# Patient Record
Sex: FEMALE | Race: BLACK OR AFRICAN AMERICAN | NOT HISPANIC OR LATINO | Employment: FULL TIME | URBAN - METROPOLITAN AREA
[De-identification: names, ages, dates, MRNs, and addresses within clinical notes are randomized per-mention and may not be internally consistent; named-entity substitution may affect disease eponyms.]

---

## 2023-04-29 LAB
ALANINE AMINOTRANSFERASE (SGPT) (U/L) IN SER/PLAS: 43 U/L (ref 7–45)
ALBUMIN (G/DL) IN SER/PLAS: 3.8 G/DL (ref 3.4–5)
ALBUMIN (MG/L) IN URINE: <7 MG/L
ALBUMIN/CREATININE (UG/MG) IN URINE: NORMAL UG/MG CRT (ref 0–30)
ALKALINE PHOSPHATASE (U/L) IN SER/PLAS: 97 U/L (ref 33–110)
ANION GAP IN SER/PLAS: 13 MMOL/L (ref 10–20)
ASPARTATE AMINOTRANSFERASE (SGOT) (U/L) IN SER/PLAS: 40 U/L (ref 9–39)
BILIRUBIN TOTAL (MG/DL) IN SER/PLAS: 0.6 MG/DL (ref 0–1.2)
CALCIUM (MG/DL) IN SER/PLAS: 9.2 MG/DL (ref 8.6–10.6)
CARBON DIOXIDE, TOTAL (MMOL/L) IN SER/PLAS: 25 MMOL/L (ref 21–32)
CHLORIDE (MMOL/L) IN SER/PLAS: 101 MMOL/L (ref 98–107)
CHOLESTEROL (MG/DL) IN SER/PLAS: 159 MG/DL (ref 0–199)
CHOLESTEROL IN HDL (MG/DL) IN SER/PLAS: 51.1 MG/DL
CHOLESTEROL/HDL RATIO: 3.1
CORTISOL (UG/DL) IN SERUM - AM: 9.9 UG/DL (ref 5–20)
CREATININE (MG/DL) IN SER/PLAS: 0.79 MG/DL (ref 0.5–1.05)
CREATININE (MG/DL) IN URINE: 43.6 MG/DL (ref 20–320)
ESTIMATED AVERAGE GLUCOSE FOR HBA1C: 289 MG/DL
GFR FEMALE: >90 ML/MIN/1.73M2
GLUCOSE (MG/DL) IN SER/PLAS: 421 MG/DL (ref 74–99)
HEMOGLOBIN A1C/HEMOGLOBIN TOTAL IN BLOOD: 11.7 %
LDL: 88 MG/DL (ref 0–99)
POTASSIUM (MMOL/L) IN SER/PLAS: 5 MMOL/L (ref 3.5–5.3)
PROTEIN TOTAL: 7.4 G/DL (ref 6.4–8.2)
SODIUM (MMOL/L) IN SER/PLAS: 134 MMOL/L (ref 136–145)
THYROTROPIN (MIU/L) IN SER/PLAS BY DETECTION LIMIT <= 0.05 MIU/L: 4.05 MIU/L (ref 0.44–3.98)
THYROXINE (T4) FREE (NG/DL) IN SER/PLAS: 0.89 NG/DL (ref 0.78–1.48)
TRIGLYCERIDE (MG/DL) IN SER/PLAS: 98 MG/DL (ref 0–149)
UREA NITROGEN (MG/DL) IN SER/PLAS: 16 MG/DL (ref 6–23)
VLDL: 20 MG/DL (ref 0–40)

## 2023-05-01 LAB — URINE CULTURE: ABNORMAL

## 2023-05-03 LAB — ADRENOCORTICOTROPIC HORMONE: 11.1 PG/ML (ref 7.2–63.3)

## 2023-06-13 ENCOUNTER — OFFICE VISIT (OUTPATIENT)
Dept: PRIMARY CARE | Facility: CLINIC | Age: 33
End: 2023-06-13
Payer: COMMERCIAL

## 2023-06-13 VITALS
DIASTOLIC BLOOD PRESSURE: 74 MMHG | SYSTOLIC BLOOD PRESSURE: 110 MMHG | BODY MASS INDEX: 26.12 KG/M2 | HEART RATE: 92 BPM | HEIGHT: 64 IN | WEIGHT: 153 LBS

## 2023-06-13 DIAGNOSIS — E05.00 GRAVES DISEASE: ICD-10-CM

## 2023-06-13 DIAGNOSIS — R74.8 ABNORMAL LIVER ENZYMES: ICD-10-CM

## 2023-06-13 DIAGNOSIS — E10.10 TYPE 1 DIABETES MELLITUS WITH KETOACIDOSIS WITHOUT COMA (MULTI): Primary | ICD-10-CM

## 2023-06-13 DIAGNOSIS — R19.7 DIARRHEA, UNSPECIFIED TYPE: ICD-10-CM

## 2023-06-13 DIAGNOSIS — K21.9 GASTROESOPHAGEAL REFLUX DISEASE WITHOUT ESOPHAGITIS: ICD-10-CM

## 2023-06-13 DIAGNOSIS — E01.0 THYROMEGALY: ICD-10-CM

## 2023-06-13 DIAGNOSIS — E04.0 DIFFUSE GOITER: ICD-10-CM

## 2023-06-13 PROBLEM — E11.36 CATARACT, DIABETIC (MULTI): Status: ACTIVE | Noted: 2023-06-13

## 2023-06-13 PROBLEM — E06.3 HASHIMOTO'S THYROIDITIS: Status: ACTIVE | Noted: 2023-06-13

## 2023-06-13 PROBLEM — H26.492 PCO (POSTERIOR CAPSULAR OPACIFICATION), LEFT: Status: ACTIVE | Noted: 2023-06-13

## 2023-06-13 PROCEDURE — 3078F DIAST BP <80 MM HG: CPT | Performed by: INTERNAL MEDICINE

## 2023-06-13 PROCEDURE — 86003 ALLG SPEC IGE CRUDE XTRC EA: CPT

## 2023-06-13 PROCEDURE — 86225 DNA ANTIBODY NATIVE: CPT

## 2023-06-13 PROCEDURE — 99214 OFFICE O/P EST MOD 30 MIN: CPT | Performed by: INTERNAL MEDICINE

## 2023-06-13 PROCEDURE — 86039 ANTINUCLEAR ANTIBODIES (ANA): CPT

## 2023-06-13 PROCEDURE — 86038 ANTINUCLEAR ANTIBODIES: CPT

## 2023-06-13 PROCEDURE — 3046F HEMOGLOBIN A1C LEVEL >9.0%: CPT | Performed by: INTERNAL MEDICINE

## 2023-06-13 PROCEDURE — 3074F SYST BP LT 130 MM HG: CPT | Performed by: INTERNAL MEDICINE

## 2023-06-13 PROCEDURE — 86235 NUCLEAR ANTIGEN ANTIBODY: CPT

## 2023-06-13 PROCEDURE — 1036F TOBACCO NON-USER: CPT | Performed by: INTERNAL MEDICINE

## 2023-06-13 RX ORDER — FAMOTIDINE 20 MG/1
20 TABLET, FILM COATED ORAL 2 TIMES DAILY
COMMUNITY
Start: 2019-10-28 | End: 2023-09-28 | Stop reason: ALTCHOICE

## 2023-06-13 RX ORDER — GLUCAGON 3 MG/1
1 POWDER NASAL AS NEEDED
COMMUNITY
Start: 2023-04-12 | End: 2023-10-09 | Stop reason: ALTCHOICE

## 2023-06-13 RX ORDER — ONDANSETRON 4 MG/1
4 TABLET, ORALLY DISINTEGRATING ORAL EVERY 8 HOURS PRN
COMMUNITY
Start: 2022-07-01

## 2023-06-13 RX ORDER — OMEPRAZOLE 40 MG/1
40 CAPSULE, DELAYED RELEASE ORAL DAILY
COMMUNITY
Start: 2023-04-21

## 2023-06-13 RX ORDER — EZETIMIBE 10 MG/1
10 TABLET ORAL NIGHTLY
COMMUNITY
Start: 2023-05-03 | End: 2023-10-04 | Stop reason: ALTCHOICE

## 2023-06-13 ASSESSMENT — PATIENT HEALTH QUESTIONNAIRE - PHQ9
2. FEELING DOWN, DEPRESSED OR HOPELESS: NOT AT ALL
SUM OF ALL RESPONSES TO PHQ9 QUESTIONS 1 AND 2: 0
1. LITTLE INTEREST OR PLEASURE IN DOING THINGS: NOT AT ALL

## 2023-06-13 NOTE — PROGRESS NOTES
"Subjective   Patient ID: Matthew Land is a 32 y.o. female who presents for [facility] and New Patient Visit.    HPI     Patient is a 32-year-old female with past medical history of Hashimoto's Graves' type 1 diabetes who presents to [facility].  Patient's main concern today is ongoing diarrhea and epigastric pain.  She has been previously diagnosed with GERD and was evaluated for celiac.  She takes a PPI regularly however no significant improvement.  She is concerned she might have an underlying food allergy.  She is never been evaluated by an allergist.  She is also never been evaluated by a gastroenterologist.  She has been evaluated for celiac previously which was negative however an IgA test was not obtained.    She follows with endocrinology regarding her type 1 diabetes.  She is states that she is supposed to get a insulin pump but currently does not have 1.  She has a continuous glucose monitor on and her A1c last was greater than 11.  She knows that her sugars are not well controlled and she has an appointment upcoming with a gastroenterologist.    Graves' disease and possible Hashimoto's.  Her most recent TSH was unremarkable.  She is currently not on levothyroxine.  Her thyroid labs are being monitored by endocrinology        Review of Systems  Constitutional: No fever or chills  Cardiovascular: no chest pain, no palpitations and no syncope.   Respiratory: no cough, no shortness of breath during exertion and no shortness of breath at rest.   Gastrointestinal: no abdominal pain, no nausea and no vomiting.  Neuro: No Headache, no dizziness    Objective   /74   Pulse 92   Ht 1.613 m (5' 3.5\")   Wt 69.4 kg (153 lb)   BMI 26.68 kg/m²     Physical Exam  Constitutional: Alert and in no acute distress. Well developed, well nourished  Head and Face: Head and face: Normal.    Cardiovascular: Heart rate and rhythm were normal, normal S1 and S2. No peripheral edema.   Pulmonary: No respiratory " distress. Clear bilateral breath sounds.  Musculoskeletal: Gait and station: Normal. Muscle strength/tone: Normal.   Skin: Normal skin color and pigmentation, normal skin turgor, and no rash.    Psychiatric: Judgment and insight: Intact. Mood and affect: Normal.        Lab Results   Component Value Date    WBC 7.6 09/16/2021    HGB 14.1 09/16/2021    HCT 43.3 09/16/2021     09/16/2021    CHOL 159 04/29/2023    TRIG 98 04/29/2023    HDL 51.1 04/29/2023    ALT 43 04/29/2023    AST 40 (H) 04/29/2023     (L) 04/29/2023    K 5.0 04/29/2023     04/29/2023    CREATININE 0.79 04/29/2023    BUN 16 04/29/2023    CO2 25 04/29/2023    TSH 4.05 (H) 04/29/2023    HGBA1C 11.7 (A) 04/29/2023       Electrocardiogram 12 Lead  Please see ED Provider Note for formal interpretation  Confirmed by DAVINA MORRELL PA-C ([id number]) on 9/17/2021 3:12:41 AM            Assessment/Plan   Problem List Items Addressed This Visit          Digestive    GERD (gastroesophageal reflux disease)     Unresponsive to PPI.  Will refer to gastroenterology.  Check IgA levels.  Check allergy food panel.  Encourage Benefiber.  Follow-up after evaluation by gastroenterology         Relevant Orders    IgA       Endocrine/Metabolic    Type 1 diabetes mellitus without complication (CMS/HCC) - Primary     Uncontrolled at this time.  Would benefit from not only a continuous glucose monitor but a insulin pump.  Advise follow-up with endocrinology to discuss further         Diffuse goiter     Continue following with endocrinology for repeat thyroid labs         Relevant Orders    Food Allergy Profile IgE    ANISHA with Reflex to LINDA    IgA    Graves disease    Relevant Orders    Food Allergy Profile IgE    ANISHA with Reflex to LINDA    IgA    Thyromegaly    Relevant Orders    Food Allergy Profile IgE    ANISHA with Reflex to LINDA    IgA       Other    Abnormal liver enzymes    Relevant Orders    Food Allergy Profile IgE    ANISHA with Reflex to LINDA    IgA     Other Visit  Diagnoses       Diarrhea, unspecified type        Relevant Orders    Referral to Gastroenterology    IgA

## 2023-06-14 LAB
ANA PATTERN: ABNORMAL
ANA TITER: ABNORMAL
ANTI-NUCLEAR ANTIBODY (ANA): POSITIVE

## 2023-06-15 LAB
ALLERGEN FOOD: CLAM (RUDITAPES SPP.) IGE (KU/L): <0.1 KU/L
ALLERGEN FOOD: EGG WHITE IGE (KU/L): <0.1 KU/L
ALLERGEN FOOD: FISH (COD) GADUS MORHUA) IGE (KU/L): <0.1 KU/L
ALLERGEN FOOD: MAIZE, CORN (ZEA MAYS) IGE (KU/L): <0.1 KU/L
ALLERGEN FOOD: MILK IGE (KU/L): 0.28 KU/L
ALLERGEN FOOD: PEANUT (ARACHIS HYPOGAEA) IGE (KU/L): <0.1 KU/L
ALLERGEN FOOD: SCALLOP (PECTEN SPP.) IGE (KU/L): <0.1 KU/L
ALLERGEN FOOD: SESAME SEED (SESAMUM INDICUM) IGE (KU/L): <0.1 KU/L
ALLERGEN FOOD: SHRIMP (P. BOREALIS/MONODON, M. BARBATA/JOYNERI) IGE (KU/L): <0.1 KU/L
ALLERGEN FOOD: SOYBEAN (GLYCINE MAX) IGE (KU/L): <0.1 KU/L
ALLERGEN FOOD: WALNUT (JUGLANS SPP.) IGE (KU/L): <0.1 KU/L
ALLERGEN FOOD: WHEAT (TRITICUM AESTIVUM) IGE (KU/L): <0.1 KU/L
ANTI-CENTROMERE: <0.2 AI
ANTI-CHROMATIN: <0.2 AI
ANTI-DNA (DS): 1 IU/ML
ANTI-JO-1 IGG: <0.2 AI
ANTI-RIBOSOMAL P: <0.2 AI
ANTI-RNP: 0.2 AI
ANTI-SCL-70: <0.2 AI
ANTI-SM/RNP: <0.2 AI
ANTI-SM: <0.2 AI
ANTI-SSA: <0.2 AI
ANTI-SSB: <0.2 AI
IMMUNOCAP INTERPRETATION: NORMAL

## 2023-09-27 ENCOUNTER — PATIENT OUTREACH (OUTPATIENT)
Dept: PRIMARY CARE | Facility: CLINIC | Age: 33
End: 2023-09-27
Payer: COMMERCIAL

## 2023-09-27 NOTE — PROGRESS NOTES
Dis[facility] Facility:[facility]  Discharge Diagnosis:DM  Admission Date:9/24/23  Discharge Date: 9/26/23    PCP Appointment Date:9/28/23  Specialist Appointment Date: Endocrinology 12/20/23  Hospital Encounter and Summary: Linked   See discharge assessment below for further details  Engagement  Call Start Time: 1211 (9/27/2023 12:11 PM)    Medications  Medications reviewed with patient/caregiver?: Yes (9/27/2023 12:11 PM)  Is the patient having any side effects they believe may be caused by any medication additions or changes?: No (9/27/2023 12:11 PM)  Does the patient have all medications ordered at discharge?: Yes (9/27/2023 12:11 PM)  Prescription Comments: see med list (9/27/2023 12:11 PM)  Is the patient taking all medications as directed (includes completed medication regime)?: Yes (9/27/2023 12:11 PM)  Medication Comments: see med list, patient reported having all DM medications and Cefdinir (9/27/2023 12:11 PM)    Appointments  Does the patient have a primary care provider?: Yes (9/27/2023 12:11 PM)  Care Management Interventions: Verified appointment date/time/provider (9/28/23 [facility]) (9/27/2023 12:11 PM)  Has the patient kept scheduled appointments due by today?: Yes (9/27/2023 12:11 PM)  Care Management Interventions: Advised patient to keep appointment (9/27/2023 12:11 PM)    Patient Teaching  Does the patient have access to their discharge instructions?: Yes (9/27/2023 12:11 PM)  Care Management Interventions: Reviewed instructions with patient (9/27/2023 12:11 PM)  What is the patient's perception of their health status since discharge?: Improving (9/27/2023 12:11 PM)  Is the patient/caregiver able to teach back the hierarchy of who to call/visit for symptoms/problems? PCP, Specialist, Home Health nurse, Urgent Care, ED, 911: Yes (9/27/2023 12:11 PM)

## 2023-09-28 ENCOUNTER — OFFICE VISIT (OUTPATIENT)
Dept: PRIMARY CARE | Facility: CLINIC | Age: 33
End: 2023-09-28
Payer: COMMERCIAL

## 2023-09-28 VITALS
BODY MASS INDEX: 26.85 KG/M2 | WEIGHT: 154 LBS | SYSTOLIC BLOOD PRESSURE: 108 MMHG | DIASTOLIC BLOOD PRESSURE: 71 MMHG | HEART RATE: 92 BPM

## 2023-09-28 DIAGNOSIS — E05.00 GRAVES DISEASE: ICD-10-CM

## 2023-09-28 DIAGNOSIS — E10.9 TYPE 1 DIABETES MELLITUS WITHOUT COMPLICATION (MULTI): Primary | ICD-10-CM

## 2023-09-28 PROBLEM — E10.36: Status: ACTIVE | Noted: 2023-06-13

## 2023-09-28 PROBLEM — E11.10 DKA (DIABETIC KETOACIDOSIS) (MULTI): Status: ACTIVE | Noted: 2023-09-28

## 2023-09-28 PROCEDURE — 1036F TOBACCO NON-USER: CPT | Performed by: INTERNAL MEDICINE

## 2023-09-28 PROCEDURE — 99214 OFFICE O/P EST MOD 30 MIN: CPT | Performed by: INTERNAL MEDICINE

## 2023-09-28 PROCEDURE — 3046F HEMOGLOBIN A1C LEVEL >9.0%: CPT | Performed by: INTERNAL MEDICINE

## 2023-09-28 PROCEDURE — 3078F DIAST BP <80 MM HG: CPT | Performed by: INTERNAL MEDICINE

## 2023-09-28 PROCEDURE — 3074F SYST BP LT 130 MM HG: CPT | Performed by: INTERNAL MEDICINE

## 2023-09-28 RX ORDER — PANTOPRAZOLE SODIUM 40 MG/1
40 TABLET, DELAYED RELEASE ORAL
COMMUNITY

## 2023-09-28 RX ORDER — INSULIN GLARGINE 100 [IU]/ML
24 INJECTION, SOLUTION SUBCUTANEOUS NIGHTLY
COMMUNITY
End: 2023-09-28 | Stop reason: SDUPTHER

## 2023-09-28 RX ORDER — INSULIN LISPRO 100 [IU]/ML
10 INJECTION, SOLUTION INTRAVENOUS; SUBCUTANEOUS
COMMUNITY
End: 2023-10-04 | Stop reason: SDUPTHER

## 2023-09-28 RX ORDER — INSULIN LISPRO 100 [IU]/ML
4 INJECTION, SOLUTION INTRAVENOUS; SUBCUTANEOUS
COMMUNITY
Start: 2019-06-09 | End: 2023-09-28 | Stop reason: SDUPTHER

## 2023-09-28 RX ORDER — INSULIN LISPRO 100 [IU]/ML
4 INJECTION, SOLUTION INTRAVENOUS; SUBCUTANEOUS
Qty: 12 ML | Refills: 2 | Status: SHIPPED | OUTPATIENT
Start: 2023-09-28 | End: 2024-01-31 | Stop reason: WASHOUT

## 2023-09-28 RX ORDER — INSULIN GLARGINE 100 [IU]/ML
24 INJECTION, SOLUTION SUBCUTANEOUS NIGHTLY
Qty: 10 ML | Refills: 2 | Status: SHIPPED | OUTPATIENT
Start: 2023-09-28 | End: 2024-01-31 | Stop reason: WASHOUT

## 2023-09-28 NOTE — ASSESSMENT & PLAN NOTE
Not withstanding the fact that her A1c is greater than 12 the issue at hand is the fact that she is not taking her insulin as prescribed.  Issue #1 is that patient is concerned about the discrepancy between the continuous glucose monitor and the fingerprinting and as such she does not want to take the  Insulin sliding scale.  We will have patient see the nurse in the next couple of weeks to assess whether or not there is a discrepancy and she may need a different continuous glucose monitor.      Patient states she just needs to get and see the endocrinologist and get a insulin pump.  Explained to the patient that more importantly she needs to take her medications as prescribed as it is quite dangerous for her not to be on medications as a type I diabetic.  We will refill patient's Lantus to be taking 24 units nightly as well as 4 units of Humalog with meals with insulin sliding scale.    Continue with mealtime glucose monitoring.  Follow-up with endocrinology

## 2023-09-28 NOTE — PROGRESS NOTES
Subjective   Patient ID: Matthew Land is a 33 y.o. female who presents for Follow-up (Follow up to hospital stay-diabetes).    HPI     Patient is a 33-year-old female with past medical history of Hashimoto's Graves' type 1 diabetes who presents for follow-up.      Patient was recently admitted to the hospital with DKA.  Patient has not been taking her insulin as prescribed.  She does have an endocrinologist however the endocrinologist is leaving [facility] and she has a follow-up appointment with an endocrinologist in December.  Seems that prior to her presentation to the hospital she stopped taking her insulin.  She was very concerned.  She does not have a good reason as to why she stopped taking her insulin.  She has some hesitancy taking her insulin because she does not want to become hypoglycemic she has a continuous glucose monitor and there appears to be a discrepancy between the finger prick as well as the continuous glucose monitor.  As such she stopped taking her insulin.  In the hospital she was restarted on her insulin and started on an insulin drip.  Subsequently discharged since being discharged she has been taking her insulin but check she has been reluctant to take the insulin sliding scale.    Most recent A1c is 12  Graves' disease and possible Hashimoto's.  Her most recent TSH was unremarkable.  She is currently not on levothyroxine.  Her thyroid labs are being monitored by endocrinology        Review of Systems  Constitutional: No fever or chills  Cardiovascular: no chest pain, no palpitations and no syncope.   Respiratory: no cough, no shortness of breath during exertion and no shortness of breath at rest.   Gastrointestinal: no abdominal pain, no nausea and no vomiting.  Neuro: No Headache, no dizziness    Objective   /71   Pulse 92   Wt 69.9 kg (154 lb)   BMI 26.85 kg/m²     Physical Exam  Constitutional: Alert and in no acute distress. Well developed, well nourished  Head  and Face: Head and face: Normal.    Cardiovascular: Heart rate and rhythm were normal, normal S1 and S2. No peripheral edema.   Pulmonary: No respiratory distress. Clear bilateral breath sounds.  Musculoskeletal: Gait and station: Normal. Muscle strength/tone: Normal.   Skin: Normal skin color and pigmentation, normal skin turgor, and no rash.    Psychiatric: Judgment and insight: Intact. Mood and affect: Normal.        Lab Results   Component Value Date    WBC 6.8 09/26/2023    HGB 12.2 09/26/2023    HCT 37.7 09/26/2023     09/26/2023    CHOL 175 09/24/2023    TRIG 59 09/24/2023    HDL 62.1 09/24/2023    ALT 24 09/25/2023    AST 19 09/25/2023     (L) 09/26/2023    K 3.9 09/26/2023     09/26/2023    CREATININE 0.69 09/26/2023    BUN 9 09/26/2023    CO2 24 09/26/2023    TSH 4.05 (H) 04/29/2023    HGBA1C 13.4 (A) 09/24/2023       Electrocardiogram 12 Lead  Please see ED Provider Note for formal interpretation  Confirmed by DAVINA MORRELL PA-C ([id number]) on 9/17/2021 3:12:41 AM            Assessment/Plan   Problem List Items Addressed This Visit       Type 1 diabetes mellitus without complication (CMS/HCC) - Primary     Not withstanding the fact that her A1c is greater than 12 the issue at hand is the fact that she is not taking her insulin as prescribed.  Issue #1 is that patient is concerned about the discrepancy between the continuous glucose monitor and the fingerprinting and as such she does not want to take the  Insulin sliding scale.  We will have patient see the nurse in the next couple of weeks to assess whether or not there is a discrepancy and she may need a different continuous glucose monitor.      Patient states she just needs to get and see the endocrinologist and get a insulin pump.  Explained to the patient that more importantly she needs to take her medications as prescribed as it is quite dangerous for her not to be on medications as a type I diabetic.  We will refill patient's Lantus to  be taking 24 units nightly as well as 4 units of Humalog with meals with insulin sliding scale.    Continue with mealtime glucose monitoring.  Follow-up with endocrinology         Relevant Medications    insulin lispro (HumaLOG) 100 unit/mL injection    insulin glargine (Lantus U-100 Insulin) 100 unit/mL injection    Other Relevant Orders    Follow Up In [facility] - PCP - Nurse Visit    Follow Up In [facility] - Pharmacy    Follow Up In [facility] - PCP - Established    Graves disease

## 2023-09-30 PROBLEM — N39.0 UTI (URINARY TRACT INFECTION), BACTERIAL: Status: ACTIVE | Noted: 2023-09-30

## 2023-09-30 PROBLEM — H26.40 ANTERIOR CAPSULAR OPACIFICATION: Status: ACTIVE | Noted: 2023-09-30

## 2023-09-30 PROBLEM — R23.4 FISSURE IN SKIN OF FOOT: Status: ACTIVE | Noted: 2023-09-30

## 2023-09-30 PROBLEM — E03.9 ADULT HYPOTHYROIDISM: Status: ACTIVE | Noted: 2023-09-30

## 2023-09-30 PROBLEM — R81 GLUCOSURIA: Status: ACTIVE | Noted: 2023-09-30

## 2023-09-30 PROBLEM — A49.9 UTI (URINARY TRACT INFECTION), BACTERIAL: Status: ACTIVE | Noted: 2023-09-30

## 2023-09-30 PROBLEM — B00.9 HSV (HERPES SIMPLEX VIRUS) INFECTION: Status: ACTIVE | Noted: 2023-09-30

## 2023-09-30 PROBLEM — R30.0 DYSURIA: Status: ACTIVE | Noted: 2023-09-30

## 2023-09-30 PROBLEM — R26.2 DIFFICULTY WALKING: Status: ACTIVE | Noted: 2023-09-30

## 2023-09-30 PROBLEM — Z96.1 PSEUDOPHAKIA OF BOTH EYES: Status: ACTIVE | Noted: 2023-09-30

## 2023-09-30 PROBLEM — K21.9 GASTROESOPHAGEAL REFLUX DISEASE WITHOUT ESOPHAGITIS: Status: ACTIVE | Noted: 2022-07-13

## 2023-09-30 RX ORDER — BLOOD-GLUCOSE TRANSMITTER
EACH MISCELLANEOUS AS NEEDED
COMMUNITY
End: 2024-04-24 | Stop reason: SDUPTHER

## 2023-09-30 RX ORDER — BLOOD-GLUCOSE,RECEIVER,CONT
1 EACH MISCELLANEOUS AS NEEDED
COMMUNITY

## 2023-09-30 RX ORDER — IBUPROFEN 200 MG
TABLET ORAL AS NEEDED
COMMUNITY

## 2023-09-30 RX ORDER — GLUCAGON 1 MG
VIAL (EA) INJECTION ONCE AS NEEDED
COMMUNITY
End: 2023-10-09 | Stop reason: ALTCHOICE

## 2023-09-30 RX ORDER — INSULIN GLARGINE 100 [IU]/ML
20 INJECTION, SOLUTION SUBCUTANEOUS EVERY MORNING
COMMUNITY
End: 2023-10-04 | Stop reason: SDUPTHER

## 2023-09-30 RX ORDER — LANCETS 33 GAUGE
EACH MISCELLANEOUS
COMMUNITY

## 2023-09-30 RX ORDER — INSULIN LISPRO 100 [IU]/ML
8 INJECTION, SOLUTION INTRAVENOUS; SUBCUTANEOUS
COMMUNITY
End: 2023-10-04 | Stop reason: SDUPTHER

## 2023-09-30 RX ORDER — VALACYCLOVIR HYDROCHLORIDE 500 MG/1
500 TABLET, FILM COATED ORAL DAILY
COMMUNITY

## 2023-09-30 RX ORDER — BLOOD-GLUCOSE SENSOR
EACH MISCELLANEOUS
COMMUNITY
End: 2024-04-24 | Stop reason: SDUPTHER

## 2023-09-30 RX ORDER — DEXTROSE 4 G
TABLET,CHEWABLE ORAL
COMMUNITY

## 2023-09-30 RX ORDER — CEPHALEXIN 500 MG/1
500 CAPSULE ORAL EVERY 12 HOURS
COMMUNITY
End: 2023-12-14 | Stop reason: ALTCHOICE

## 2023-09-30 RX ORDER — PEN NEEDLE, DIABETIC 30 GX3/16"
NEEDLE, DISPOSABLE MISCELLANEOUS
COMMUNITY

## 2023-10-04 ENCOUNTER — LAB (OUTPATIENT)
Dept: LAB | Facility: LAB | Age: 33
End: 2023-10-04
Payer: COMMERCIAL

## 2023-10-04 ENCOUNTER — OFFICE VISIT (OUTPATIENT)
Dept: ENDOCRINOLOGY | Facility: CLINIC | Age: 33
End: 2023-10-04
Payer: COMMERCIAL

## 2023-10-04 VITALS
DIASTOLIC BLOOD PRESSURE: 83 MMHG | WEIGHT: 159 LBS | HEIGHT: 64 IN | SYSTOLIC BLOOD PRESSURE: 117 MMHG | BODY MASS INDEX: 27.14 KG/M2 | HEART RATE: 84 BPM

## 2023-10-04 DIAGNOSIS — E10.65 TYPE 1 DIABETES MELLITUS WITH HYPERGLYCEMIA, WITH LONG-TERM CURRENT USE OF INSULIN (MULTI): Primary | ICD-10-CM

## 2023-10-04 DIAGNOSIS — E06.3 HASHIMOTO'S THYROIDITIS: ICD-10-CM

## 2023-10-04 DIAGNOSIS — E10.65 TYPE 1 DIABETES MELLITUS WITH HYPERGLYCEMIA, WITH LONG-TERM CURRENT USE OF INSULIN (MULTI): ICD-10-CM

## 2023-10-04 PROBLEM — R81 GLUCOSURIA: Status: RESOLVED | Noted: 2023-09-30 | Resolved: 2023-10-04

## 2023-10-04 LAB
ALBUMIN SERPL BCP-MCNC: 3.7 G/DL (ref 3.4–5)
ALP SERPL-CCNC: 78 U/L (ref 33–110)
ALT SERPL W P-5'-P-CCNC: 24 U/L (ref 7–45)
ANION GAP SERPL CALC-SCNC: 11 MMOL/L (ref 10–20)
AST SERPL W P-5'-P-CCNC: 26 U/L (ref 9–39)
BILIRUB SERPL-MCNC: 0.6 MG/DL (ref 0–1.2)
BUN SERPL-MCNC: 9 MG/DL (ref 6–23)
CALCIUM SERPL-MCNC: 9 MG/DL (ref 8.6–10.6)
CHLORIDE SERPL-SCNC: 105 MMOL/L (ref 98–107)
CO2 SERPL-SCNC: 29 MMOL/L (ref 21–32)
CREAT SERPL-MCNC: 0.64 MG/DL (ref 0.5–1.05)
CREAT UR-MCNC: 69.6 MG/DL (ref 20–320)
GFR SERPL CREATININE-BSD FRML MDRD: >90 ML/MIN/1.73M*2
GLUCOSE SERPL-MCNC: 127 MG/DL (ref 74–99)
MICROALBUMIN UR-MCNC: <7 MG/L
MICROALBUMIN/CREAT UR: NORMAL MG/G{CREAT}
POTASSIUM SERPL-SCNC: 4.7 MMOL/L (ref 3.5–5.3)
PROT SERPL-MCNC: 6.8 G/DL (ref 6.4–8.2)
SODIUM SERPL-SCNC: 140 MMOL/L (ref 136–145)
TSH SERPL-ACNC: 6.13 MIU/L (ref 0.44–3.98)

## 2023-10-04 PROCEDURE — 1036F TOBACCO NON-USER: CPT | Performed by: NURSE PRACTITIONER

## 2023-10-04 PROCEDURE — 3062F POS MACROALBUMINURIA REV: CPT | Performed by: NURSE PRACTITIONER

## 2023-10-04 PROCEDURE — 3079F DIAST BP 80-89 MM HG: CPT | Performed by: NURSE PRACTITIONER

## 2023-10-04 PROCEDURE — 80053 COMPREHEN METABOLIC PANEL: CPT

## 2023-10-04 PROCEDURE — 82043 UR ALBUMIN QUANTITATIVE: CPT

## 2023-10-04 PROCEDURE — 82570 ASSAY OF URINE CREATININE: CPT

## 2023-10-04 PROCEDURE — 84439 ASSAY OF FREE THYROXINE: CPT

## 2023-10-04 PROCEDURE — 3046F HEMOGLOBIN A1C LEVEL >9.0%: CPT | Performed by: NURSE PRACTITIONER

## 2023-10-04 PROCEDURE — 95251 CONT GLUC MNTR ANALYSIS I&R: CPT | Performed by: NURSE PRACTITIONER

## 2023-10-04 PROCEDURE — 99214 OFFICE O/P EST MOD 30 MIN: CPT | Performed by: NURSE PRACTITIONER

## 2023-10-04 PROCEDURE — 84443 ASSAY THYROID STIM HORMONE: CPT

## 2023-10-04 PROCEDURE — 3074F SYST BP LT 130 MM HG: CPT | Performed by: NURSE PRACTITIONER

## 2023-10-04 PROCEDURE — 36415 COLL VENOUS BLD VENIPUNCTURE: CPT

## 2023-10-04 NOTE — PATIENT INSTRUCTIONS
Type 1 diabetes mellitus, is not at goal with A1C 13.1% in offoce today. Discussed that A1C this high indicates she is not giving all insulin.   Discussed insulin dosing, basal, bolus, insulin to carb ratio, sensitivity.   She will continue 24 units lantus and change Humalog 1 unit per 12 grams carb and 1 unit per 50 mg/dl   Education:  referred to Diabetes Educator for prepump education.   Follow up: I recommend diabetes care be 2 months.    Hashimotos: Patient is not currently taking mediations. We have reordered labs.     Hyperlipidemia: Stopped Ezetimibe. Unsure why. Labs ordered.

## 2023-10-04 NOTE — PROGRESS NOTES
Subjective   Matthew Land is a 33 y.o. female who presents for an initial visit for evaluation of Type 1 diabetes mellitus. The initial diagnosis of diabetes was made  age 25 .     Known complications due to diabetes included NONE    Cardiovascular risk factors include none. The patient has been previously hospitalized due to diabetic ketoacidosis on September 23, 2023.     Current diabetes regimen is as follows:   Lantus 24 units noghtly  Humalog base 4 plus sliding scale 1 unit per 50 mg/dl> 150 mg/dl    The patient is currently checking the blood glucose 5 times per day.    Patient is using: both glucometer and continuous glucose monitor    Hypoglycemia frequency: <2%  Hypoglycemia awareness: Yes     Exercise: daily   Meal panning: She is using no plan.    Review of Systems    Objective   There were no vitals taken for this visit.  Physical Exam    Lab Review      Health Maintenance:   Eye Exam: Agueda Hendricks, seen Jan 2023, denies retinopathy  Lipid Panel: Took Ezetimibe in the past, is not taking currently  Urine Albumin:    Assessment/Plan   Diagnoses and all orders for this visit:  Type 1 diabetes mellitus with hyperglycemia, with long-term current use of insulin (CMS/[facility])  Hashimoto's thyroiditis    Downloaded and interrogated Dexcom CGM. Patient average glucose 210 mg/dl with time in range of  mg/dl 40%, 181-250 mg/dl 29%, > 250 mg/dl 30%. Patient glucose is stead overnight and hyperglycemic from 12 noon-12 AM.     Type 1 diabetes mellitus, is not at goal with A1C 13.1% in offoce today. Discussed that A1C this high indicates she is not giving all insulin.   Discussed insulin dosing, basal, bolus, insulin to carb ratio, sensitivity.   She will continue 24 units lantus and change Humalog 1 unit per 12 grams carb and 1 unit per 50 mg/dl   Education:  referred to Diabetes Educator for prepump education.   Follow up: I recommend diabetes care be 2 months.    Hashimotos: Patient is not currently taking  mediations. We have reordered labs.     Hyperlipidemia: Stopped Ezetimibe. Unsure why. Labs ordered.

## 2023-10-05 DIAGNOSIS — E06.3 HASHIMOTO'S THYROIDITIS: Primary | ICD-10-CM

## 2023-10-05 LAB — T4 FREE SERPL-MCNC: 0.72 NG/DL (ref 0.78–1.48)

## 2023-10-05 RX ORDER — LEVOTHYROXINE SODIUM 50 UG/1
50 TABLET ORAL DAILY
Qty: 90 TABLET | Refills: 3 | Status: SHIPPED | OUTPATIENT
Start: 2023-10-05 | End: 2024-10-04

## 2023-10-09 ENCOUNTER — TELEPHONE (OUTPATIENT)
Dept: ENDOCRINOLOGY | Facility: CLINIC | Age: 33
End: 2023-10-09

## 2023-10-09 ENCOUNTER — CLINICAL SUPPORT (OUTPATIENT)
Dept: ENDOCRINOLOGY | Facility: CLINIC | Age: 33
End: 2023-10-09
Payer: COMMERCIAL

## 2023-10-09 DIAGNOSIS — E10.65 TYPE 1 DIABETES MELLITUS WITH HYPERGLYCEMIA, WITH LONG-TERM CURRENT USE OF INSULIN (MULTI): Primary | ICD-10-CM

## 2023-10-09 PROCEDURE — 99211 OFF/OP EST MAY X REQ PHY/QHP: CPT | Performed by: NURSE PRACTITIONER

## 2023-10-09 PROCEDURE — 95251 CONT GLUC MNTR ANALYSIS I&R: CPT | Performed by: NURSE PRACTITIONER

## 2023-10-09 RX ORDER — INSULIN LISPRO 100 [IU]/ML
70 INJECTION, SOLUTION INTRAVENOUS; SUBCUTANEOUS CONTINUOUS
Qty: 3000 ML | Refills: 3 | Status: SHIPPED | OUTPATIENT
Start: 2023-10-09 | End: 2023-10-09 | Stop reason: ALTCHOICE

## 2023-10-09 RX ORDER — GLUCAGON INJECTION, SOLUTION 1 MG/.2ML
1 INJECTION, SOLUTION SUBCUTANEOUS AS NEEDED
Qty: 0.2 ML | Refills: 3 | Status: SHIPPED | OUTPATIENT
Start: 2023-10-09

## 2023-10-09 RX ORDER — INSULIN LISPRO 100 [IU]/ML
70 INJECTION, SOLUTION INTRAVENOUS; SUBCUTANEOUS CONTINUOUS
Qty: 3000 ML | Refills: 3 | Status: SHIPPED | OUTPATIENT
Start: 2023-10-09 | End: 2024-01-31 | Stop reason: SDUPTHER

## 2023-10-09 NOTE — PROGRESS NOTES
Reason for Visit:  Matthew Land is a 33 y.o. female who presents for Initial DSME Visit    DSME - Global Assessment    Referring Provider: Sugar Lama CNP, DNP    Marital Status: .  Support Person: Name: Saul and Relationship to patient: spouse.    What do you hope to gain from this diabetes education visit? Learn about Omnipod 5. Already uses DexcomG6    Have you had diabetes education in the past?  Yes. When: Saw JAY inpatient and saw JAY Cueva for DSME within past year or so. Filling out global assesment in case .  In Your words, what is Diabetes: A pain, a hassle  What Concerns you most about having diabetes: I just want to control it finally     Readiness to Learn: demonstrates willingness to learn and demonstrates ability to learn  Preferred learning method: reading, observing, reading and writing, listening, and doing    Household Composition: living independently, with family    Demographics:   Difficulties with: None  Highest Level of Education: Some College  Race/Ethnic Origin: /Black  Occupation:  Lead   Work hours: 8am-5pm M-F    Health Status:  Smoking/Tobacco Use: No, patient does not smoke or use tobacco.  Alcohol Use: Frequency: occasionally.    Type of Diabetes: Type 1  What year were you diagnosed with diabetes: 2295-1650  Family History: No known family members with diabetes    Comorbidities/Chronic Complications: graves disease (other autoimmune diseases)    Lab Results   Component Value Date    HGBA1C 13.4 (A) 09/24/2023    HGBA1C 11.7 (A) 04/29/2023    HGBA1C 12.2 (A) 01/07/2023    HGBA1C 11.4 (A) 04/08/2022    HGBA1C 11.4 (A) 04/08/2022    HGBA1C 15.1 (A) 01/26/2022       Health Utilization Past 12 Months:   Hospital Admissions: Yes. Number of Visits: 1.  ER Visits: Yes. Number of Visits: 2.  Primary Care Visits: Yes. Number of Visits: 2.  Last Eye Exam : earlier 2023  Podiatry : Sees Sherrell Mccollum for podiatry annually and has had  the microfilament exam in the past year as well.  Dentist : Last Visit: a little over a  year ago    Diabetes Self-Management Skills and Behaviors:   Do you exercise regularly?: Yes. 30 minutes per day  Physical Activity : walking and walking to work, playing with kids   Yes  How do you manage your diabetes when you are sick?: drink more fluids, test blood sugar more often, and provided info on [employer] Reli[employer] brand ketone strips and how to use them to identify if extra self-management tasks/reaching out to our team is needed.     Diabetes Medications: insulin pens, insulin, and nasal glucagon. Patient reports adverse burning reaction to nasal Glucagon Baqsimi and would like Gvoke pen instead.     Insulin:   Lantus 24 units @ 530pm   Humalog 4 units base dose + sliding scale 1:50 >150, takes about 30 units a day humalog     Starting settings recommended for OP5   TDD ~54 units   Basal 0000 1.15  Carb ratio: 1:7.5  Correction: 1:35  Target glucose: 110, correct above 110   Reverse correction OFF     Current Outpatient Medications   Medication Sig Dispense Refill    Baqsimi 3 mg/actuation spray,non-aerosol 1 Application by local intranasal application route if needed (for hypoglycemia).      blood sugar diagnostic (ONETOUCH VERIO TEST STRIPS MISC) by in vitro route 3 times a day.      blood-glucose meter misc       blood-glucose sensor (Dexcom G6 Sensor) device       cephalexin (Keflex) 500 mg capsule Take 1 capsule (500 mg) by mouth every 12 hours.      Dexcom G4 platinum  (Dexcom G6 ) misc Inject 1 Device under the skin if needed. Use as instructed      Dexcom G4 platinum transmitter (Dexcom G6 Transmitter) device Inject under the skin if needed. Use as instructed      glucagon (Glucagon Emergency Kit, human,) 1 mg injection Inject under the skin 1 time if needed for low blood sugar - see comments. Use as directed for severe hypoglycemia <55      glucose 4 gram chewable tablet Chew if needed for low  "blood sugar - see comments.      insulin glargine (Lantus U-100 Insulin) 100 unit/mL injection Inject 24 Units under the skin once daily at bedtime. 10 mL 2    insulin lispro (HumaLOG) 100 unit/mL injection Inject 4 Units under the skin 3 times a day with meals. 12 mL 2    ketone blood test strip       lancets 33 gauge misc       levothyroxine (Synthroid, Levoxyl) 50 mcg tablet Take 1 tablet (50 mcg) by mouth once daily. 90 tablet 3    omeprazole (PriLOSEC) 40 mg DR capsule Take 1 capsule (40 mg) by mouth once daily.      ondansetron ODT (Zofran-ODT) 4 mg disintegrating tablet Take 1 tablet (4 mg) by mouth every 8 hours if needed for vomiting.      pantoprazole (ProtoNix) 40 mg EC tablet Take 1 tablet (40 mg) by mouth once daily in the morning. Take before meals.      pen needle, diabetic 31 gauge x 3/16\" needle 5 times a day.      valACYclovir (Valtrex) 500 mg tablet Take 1 tablet (500 mg) by mouth once daily.       No current facility-administered medications for this visit.     Injection/Infusion Sites: abdominal wall and arm for Dexcom sites.  Has some lumpy/bumpy areas occasionally  advised her that it is OK to use arms for Dexcom to help abdomen heal. Recommended rotating sites.     Monitorng: CGM: DexcomG6 CGM mobile richi. Patient is synced to Clarity, but most recent data appearing is only as of 10/4. Deleted sharing and re-invited patient to share. Visibly saw her current active session on her phone. It is not synching. See below for most recent Clarity report.         Acute Complications-Safety: carries glucose    Hypoglycemia: Frequency: a few times a month and sweating  Hypoglycemia Treatment: Juice and a carb like cereal     Hyperglycemia: frequency: daily , defines high as over 300, feels symptoms over 350  Hyperglycemia Treatment: Takes sliding scale if in the 300s every 4 hours     Reproductive/Currently Pregnant : No.  Do you use birth control? : Yes, tubes tied   Are you planning to become pregnant " in the future? : No  Have you reached Menopause? : No    Diabetes Assessment:   DM Interferes with other aspects of my life: agree.  My level of stress is high: agree. Not very high but somewhat high.  I struggle with making changes in my life: neutral. Depends on the type of c hange   How do you handle stress: eat, shop, listen to music   Most difficult part of managing DM: managing it in general     DSME - Meal Planning and Diet Recall  Are you currently following any meal plan: Carbohydrate Counting.    Does your culture or Shinto require any of the following:  none  Who does the grocery shopping? patient  Who does the cooking in the home? patient    How often do you eat out? Once a week   How many meals do you eat in per day: two.  Which meals do you tend to skip: breakfast  What do you drink with and between meals: water and juice    Diet Recall:   Did not focus on this today, focused the rest of the visit on Omnipod5 education. Reviewed carb counting with patient she is able to count carbs in foods she ate yesterday just fine.     DSME - Goals and Recommendations    [facility] Diabetes Education Program SMART Behavior Goal Setting:        S - Specific: Exactly what do you want to do        M - Measurable: Use a calendar or chart to track progress        A - Attainable: Take small steps to make bigger changes        R - Realistic: Pick something reasonable that you know you can do        T - Time Oriented: Choose a time limit (No longer than 6 months)    Specific Goal:   I will not overtreat for hypos for the next four weeks.  Will buy fruit snack or juice box packs that are 15g carbs. Has support from .     Measurable: How will I track my goal:  I will keep track of my progress daily by richi on phone.    Time Oriented: four weeks.    Topics Covered and Impression:    DSME Topics Covered During Visit:   Glucagon Teaching  Review Glycemic Goals (CGM or SMBG)  Reviewed Hypoglycemia  Signs/Symptoms/Tx Plan  Reviewed Hyperglycemia Signs/Symptoms/Tx Plan  Glycemic Pattern Management  Sick Day Rules  Ketone Monitoring  Insulin pump education, AID education. Patient would like to go with Omnipod5 system.     Reviewed Diabetes Technology: Omnipod5 AID system, [employer]         DSME Topics for Follow-Up:   Insulin pump start and follow-up      Materials provided during today's visit: Omnipod5 PowerPoint, Omnipod5 pre-pump training guide       Provider Impression: Patient is ready and motivated to start OP5.     Time: I personally spent a total of 60 minutes with the patient providing diabetes self-management education. Visit documentation will be sent electronically to referring provider.     Provider on site: Pearl Paul, REE Suggs PhD, RN, BC-ADM, [facility]

## 2023-10-09 NOTE — TELEPHONE ENCOUNTER
Prior Auth for pump pending determination  Submitted on 10/9/23 via Epic system    PA ID#: [id number]

## 2023-10-11 ENCOUNTER — PATIENT OUTREACH (OUTPATIENT)
Dept: PRIMARY CARE | Facility: CLINIC | Age: 33
End: 2023-10-11

## 2023-10-18 NOTE — TELEPHONE ENCOUNTER
System still has not responded. I cancelled the PA through [facility] and have resubmitted through [employer]    Prior Auth for Omnipod Intro Kit pending determination  Submitted on 10/18 via [facility]    PA ID#: [id number] for intro Kit  PA ID# [id number] for pods

## 2023-10-19 NOTE — TELEPHONE ENCOUNTER
"[employer] denied the pods!!!!      I have resubmitted the pa with a sternly written \"rationale\" provided. Based on their own reasons for denying, she actually MEETS the criteria on multiple points. I provided the approval letter for the intro kit, her notes and the denial letter they sent asking them to make this right!    KEY: [id number]  Pending determination  "

## 2023-10-26 ENCOUNTER — PATIENT OUTREACH (OUTPATIENT)
Dept: PRIMARY CARE | Facility: CLINIC | Age: 33
End: 2023-10-26
Payer: COMMERCIAL

## 2023-12-14 ENCOUNTER — OFFICE VISIT (OUTPATIENT)
Dept: ENDOCRINOLOGY | Facility: CLINIC | Age: 33
End: 2023-12-14
Payer: COMMERCIAL

## 2023-12-14 VITALS
HEART RATE: 80 BPM | DIASTOLIC BLOOD PRESSURE: 79 MMHG | WEIGHT: 152.1 LBS | BODY MASS INDEX: 26.95 KG/M2 | HEIGHT: 63 IN | SYSTOLIC BLOOD PRESSURE: 117 MMHG | RESPIRATION RATE: 16 BRPM | TEMPERATURE: 97.8 F

## 2023-12-14 DIAGNOSIS — E06.3 HASHIMOTO'S THYROIDITIS: ICD-10-CM

## 2023-12-14 DIAGNOSIS — E10.65 TYPE 1 DIABETES MELLITUS WITH HYPERGLYCEMIA, WITH LONG-TERM CURRENT USE OF INSULIN (MULTI): Primary | ICD-10-CM

## 2023-12-14 DIAGNOSIS — E78.5 HYPERLIPIDEMIA, UNSPECIFIED HYPERLIPIDEMIA TYPE: ICD-10-CM

## 2023-12-14 DIAGNOSIS — E03.9 ADULT HYPOTHYROIDISM: ICD-10-CM

## 2023-12-14 PROCEDURE — 3062F POS MACROALBUMINURIA REV: CPT | Performed by: NURSE PRACTITIONER

## 2023-12-14 PROCEDURE — 3074F SYST BP LT 130 MM HG: CPT | Performed by: NURSE PRACTITIONER

## 2023-12-14 PROCEDURE — 99214 OFFICE O/P EST MOD 30 MIN: CPT | Performed by: NURSE PRACTITIONER

## 2023-12-14 PROCEDURE — 1036F TOBACCO NON-USER: CPT | Performed by: NURSE PRACTITIONER

## 2023-12-14 PROCEDURE — 3046F HEMOGLOBIN A1C LEVEL >9.0%: CPT | Performed by: NURSE PRACTITIONER

## 2023-12-14 PROCEDURE — 3078F DIAST BP <80 MM HG: CPT | Performed by: NURSE PRACTITIONER

## 2023-12-14 ASSESSMENT — ENCOUNTER SYMPTOMS
PALPITATIONS: 0
APPETITE CHANGE: 0
DIZZINESS: 0
CONSTIPATION: 0
DIARRHEA: 0
NAUSEA: 0
SHORTNESS OF BREATH: 0
ACTIVITY CHANGE: 0
NUMBNESS: 0
NERVOUS/ANXIOUS: 0
SEIZURES: 0
FREQUENCY: 0
WEAKNESS: 0
POLYDIPSIA: 0
POLYPHAGIA: 0
FATIGUE: 0
SLEEP DISTURBANCE: 0

## 2023-12-14 ASSESSMENT — PAIN SCALES - GENERAL: PAINLEVEL: 0-NO PAIN

## 2023-12-14 NOTE — PROGRESS NOTES
"Angelina Land is a 33 y.o. female who presents for an initial visit for evaluation of Type 1 diabetes mellitus. The initial diagnosis of diabetes was made  age 25 .     Known complications due to diabetes included NONE    Cardiovascular risk factors include none. The patient has been previously hospitalized due to diabetic ketoacidosis on September 23, 2023.     Current diabetes regimen is as follows:   OmniPod5 with Dexcom G6    The patient is currently checking the blood glucose 5 times per day.    Patient is using: Dexcom CGM    Hypoglycemia frequency: <2%  Hypoglycemia awareness: Yes     Exercise: daily   Meal panning: She is using no plan.    Review of Systems   Constitutional:  Negative for activity change, appetite change and fatigue.   Respiratory:  Negative for shortness of breath.    Cardiovascular:  Negative for chest pain, palpitations and leg swelling.   Gastrointestinal:  Negative for constipation, diarrhea and nausea.   Endocrine: Negative for cold intolerance, heat intolerance, polydipsia, polyphagia and polyuria.   Genitourinary:  Negative for frequency.   Musculoskeletal:  Negative for gait problem.   Skin:  Negative for rash.   Neurological:  Negative for dizziness, seizures, weakness and numbness.   Psychiatric/Behavioral:  Negative for sleep disturbance and suicidal ideas. The patient is not nervous/anxious.        Objective   /79   Pulse 80   Temp 36.6 °C (97.8 °F)   Resp 16   Ht 1.6 m (5' 3\")   Wt 69 kg (152 lb 1.6 oz)   BMI 26.94 kg/m²   Physical Exam  Constitutional:       Appearance: She is normal weight.   Pulmonary:      Effort: Pulmonary effort is normal.   Skin:     General: Skin is warm and dry.   Neurological:      Mental Status: She is alert and oriented to person, place, and time.   Psychiatric:         Mood and Affect: Mood normal.         Behavior: Behavior normal.         Thought Content: Thought content normal.         Judgment: Judgment normal. "       Lab Review    Health Maintenance:   Eye Exam: Agueda Hendricks, seen Jan 2023, denies retinopathy  Lipid Panel: Took Ezetimibe in the past, is not taking currently  Urine Albumin: <7 October 2023    Assessment/Plan   Diagnoses and all orders for this visit:  Type 1 diabetes mellitus with hyperglycemia, with long-term current use of insulin (CMS/[facility])  Hashimoto's thyroiditis    Patient is unable to update her Dexcom richi since October 2023. We are unable to     Type 1 diabetes mellitus, in office today.   Continue OmniPod 5 with Dexcom G6 CGM   Discussed insulin dosing, basal, bolus, insulin to carb ratio, sensitivity.   I have contacted  and OmniPod and will continue current settings unit we can get her synced with practice  12 AM-12 AM 1.15 units per hour  Target 110 mg/dl   Insulin to carb 1: 7.5 grams carb  Reverse correction off   IOB 3 hours.   Follow up: I recommend diabetes care be 2 months.  Hashimotos: Patient is not currently taking mediations. We discussed at length that if she cannot take on empty 30 minutes before eating the next best thing is to take it every day the same way. She will tomorrow and we will draw labs for follow up in 6-8 weeks.   Hyperlipidemia: . Agrees to take ezetimibe 10 mg daily.     She has follow up with melly Crockett in December 2023.

## 2023-12-15 ENCOUNTER — PATIENT MESSAGE (OUTPATIENT)
Dept: ENDOCRINOLOGY | Facility: CLINIC | Age: 33
End: 2023-12-15
Payer: COMMERCIAL

## 2023-12-15 NOTE — PROGRESS NOTES
FUV for type 1 diabetes. LV with Sugar Lama CNP on 12/14/2023.    History of Present Illness  33 y.o. female with hx of type 1 diabetes, Graves' disease, and HLD.    Dx: 24 yo  HbA1c: 13.4% (9/24/2023), 11.7% (04/2023), 12.2% (01/2023)  Current regimen: Omnipod 5 + Dexcom G6  Past medications: Lantus, Humalog  Complications: none    SMBG: Dexcom G6    Hypoglycemia awareness: yes    Diet:    Exercise:    ROS  General: no fever or chills  CV: no chest pain   Respiratory: no shortness of breath  MSK: no lower extremity edema  Neuro: no headache or dizziness  See HPI for Endocrine ROS    Past Medical History:   Diagnosis Date    Acute candidiasis of vulva and vagina 01/06/2014    Vaginal candidiasis    Acute vaginitis 01/06/2014    Bacterial vaginosis    Encounter for screening for infections with a predominantly sexual mode of transmission 01/06/2014    Screening examination for venereal disease    Personal history of other diseases of the female genital tract 01/06/2014    History of cervicitis    Personal history of other diseases of the respiratory system 03/02/2016    History of streptococcal pharyngitis    Personal history of other diseases of the respiratory system 03/02/2016    History of pharyngitis    Personal history of other endocrine, nutritional and metabolic disease 01/06/2014    History of diabetes mellitus       Past Surgical History:   Procedure Laterality Date    OTHER SURGICAL HISTORY  03/02/2016    Prior Surgical Procedure Not Done       Social History     Socioeconomic History    Marital status: Single     Spouse name: Not on file    Number of children: Not on file    Years of education: Not on file    Highest education level: Not on file   Occupational History    Not on file   Tobacco Use    Smoking status: Never     Passive exposure: Never    Smokeless tobacco: Never   Substance and Sexual Activity    Alcohol use: Not Currently    Drug use: Never    Sexual activity: Not on file   Other  Topics Concern    Not on file   Social History Narrative    Not on file     Social Determinants of Health     Financial Resource Strain: Not on file   Food Insecurity: Not on file   Transportation Needs: Not on file   Physical Activity: Not on file   Stress: Not on file   Social Connections: Not on file   Intimate Partner Violence: Not on file   Housing Stability: Not on file       Physical Exam   vitals were not taken for this visit.   General: not in acute distress  HEENT: DONN, EOMI  Thyroid: no goiter  Neuro: alert and oriented x 3    Current Outpatient Medications   Medication Sig Dispense Refill    blood sugar diagnostic (ONETOUCH VERIO TEST STRIPS MISC) by in vitro route 3 times a day.      blood-glucose meter misc       blood-glucose sensor (Dexcom G6 Sensor) device       Dexcom G4 platinum  (Dexcom G6 ) misc Inject 1 Device under the skin if needed. Use as instructed      Dexcom G4 platinum transmitter (Dexcom G6 Transmitter) device Inject under the skin if needed. Use as instructed      glucagon (Gvoke) 1 mg/0.2 mL solution Inject 1 mg under the skin if needed (Use with severe hypoglycemia). 0.2 mL 3    glucose 4 gram chewable tablet Chew if needed for low blood sugar - see comments.      insulin glargine (Lantus U-100 Insulin) 100 unit/mL injection Inject 24 Units under the skin once daily at bedtime. 10 mL 2    insulin lispro (HumaLOG) 100 unit/mL injection Inject 4 Units under the skin 3 times a day with meals. 12 mL 2    insulin lispro (HumaLOG) 100 unit/mL injection Inject 0.7 mL (70 Units) under the skin continuously. Take as directed per insulin instructions. 3000 mL 3    insulin pump controller, RF misc 1 Package continuously. 1 each 0    ketone blood test strip       lancets 33 gauge misc       levothyroxine (Synthroid, Levoxyl) 50 mcg tablet Take 1 tablet (50 mcg) by mouth once daily. 90 tablet 3    omeprazole (PriLOSEC) 40 mg DR capsule Take 1 capsule (40 mg) by mouth once daily.  "     ondansetron ODT (Zofran-ODT) 4 mg disintegrating tablet Take 1 tablet (4 mg) by mouth every 8 hours if needed for vomiting.      pantoprazole (ProtoNix) 40 mg EC tablet Take 1 tablet (40 mg) by mouth once daily in the morning. Take before meals.      pen needle, diabetic 31 gauge x 3/16\" needle 5 times a day.      valACYclovir (Valtrex) 500 mg tablet Take 1 tablet (500 mg) by mouth once daily.       No current facility-administered medications for this visit.       Assessment and Plan  33 y.o. female with hx of type 1 diabetes, Graves' disease, and HLD.    Type 1 diabetes mellitus  Dx: 26 yo  HbA1c: 13.4% (9/24/2023), 11.7% (04/2023), 12.2% (01/2023)  Current regimen: Omnipod 5 + Dexcom G6  Eye exam: no DR (03/2023)  Urine microalbumin: neg (10/2023)  Podiatry:  Lipids: HDL 62, , TG 59 (10/2023)    Chronically very elevated HbA1c.  Pump download reviewed.    PLAN:  -see pump settings below  -check blood sugars before every meal and bedtime  -due for eye exam 03/2024    ==========================================================  <Insulin Pump Settings>  Type of pump:  Type of insulin in pump:    SETTINGS   Basal rates:  0000 1.15    Total daily basal insulin:    Bolus:  ICR: 7.5 g  ISF:     Duration of insulin: 3 hours    ============================================================    2. Hypothyroidism  Hx of Graves' disease in 2017.   Labs from 12/2017: TSH 0.01, FT4 1.84 (N: 0.78-1.48), TT3 125 (N: ), TSI 4.2 (N< 1.3), TPO Ab > 1000  Was on methimazole but was off of it during pregnancy. Did not require methimazole after pregnancy.  TSI normalized in 2018. TPO Ab remained elevated.  TSH 10.31 in 2019. Appears to have been on LT4 for a short period of time.  TSH normal from 11/2019 to 01/2023.  TSH has been elevated in the 4 to 7 range since 01/2023.  Recently restarted on levothyroxine.    Current regimen: levothyroxine 50 mcg/day (started 10/2023)  Taking LT4 appropriately?:    Labs from " 10/2023  TSH 6.13  FT4 0.72    Had both TSI and TPO antibodies in the past. TSI normalized after pregnancy.  Now with hypothyroidism due to Hashimoto's thyroiditis.    PLAN:  -continue levothyroxine 50 mcg/day  -check TSH now    Follow-up in

## 2023-12-20 ENCOUNTER — APPOINTMENT (OUTPATIENT)
Dept: ENDOCRINOLOGY | Facility: CLINIC | Age: 33
End: 2023-12-20
Payer: COMMERCIAL

## 2024-01-03 ENCOUNTER — PATIENT OUTREACH (OUTPATIENT)
Dept: PRIMARY CARE | Facility: CLINIC | Age: 34
End: 2024-01-03
Payer: COMMERCIAL

## 2024-01-03 NOTE — PROGRESS NOTES
Patient has met target of no readmission for 90  days post hospital discharge and is graduated from Transitional Care Management program at this time.

## 2024-01-18 ENCOUNTER — TELEPHONE (OUTPATIENT)
Dept: ENDOCRINOLOGY | Facility: CLINIC | Age: 34
End: 2024-01-18
Payer: COMMERCIAL

## 2024-01-18 NOTE — TELEPHONE ENCOUNTER
Initiated/received and completed CMN for [facility] on 1/16/24  For renewal of dexcom    Form completed by me and signed by Dr. Manuel  Faxed/emailed to:  contact on 1/18/24    Form on file in email

## 2024-01-19 NOTE — TELEPHONE ENCOUNTER
[facility] requested most recent office note on 1/16  Note from 12/14/23 faxed/emailed to contact on 1/19/24

## 2024-01-25 NOTE — PROGRESS NOTES
[facility] for diabetes. LV with Sugar Lama CNP 2023.    Subjective   Matthew Land is a 33 y.o. female with a hx of type 1 diabetes, HLD, and Hashimoto's thyroiditis.    Very excited about the omnipod with her improved hemoglobin A1c. Started the omnipod right before Thanks2023. Feels like she has been under better control. Left monitor at work yesterday then omnipod . She feels like it's less work. Previously was on lantus 24 daily and 1:15 carb counting.     No weight changes, polydipsia, polyuria. No fainting. No longer having blurry vision spells. Diarrhea has also resolved. No changes with urination. No abdominal pain.     Previously followed with Dr. Garcia. She is unsure what kind of thyoid disease she has.     Taking ezetimibe for cholesterol.      Grandmother has thyroid disease and takes a daily medication and also has Crohns disease. No other family members with type 1 diabetes.     Exercise: She walks the treadmill, tries to do this once a week. Patient has 3 boys who are all healthy.     Does have intolerance to hot and cold temperature. No palpitations. Does have fatigue. Denies low energy. No issues with sleeping, this has improved with diabetes improving.     She is wondering where to get the pods and insulin from, she is worried she is currently on her last pod.       Dx: age 25  HbA1c: 9.0% (2024), 13.4% (2023), 11.7% (2023), 12.2% (2023)  Current regimen: Omnipod 5 + Dexcom G6  Past medications:  Complications: none  DKA: admitted 2023    No recent issues.   Sees ophthalmologist yearly. Has had bilateral cataract surgery. No retinopathy.   Has seen podiatry before, recommended to see yearly.  No nephrologist.     SMBG: Dexcom G6  Pateint reports sugars typically 150-200s. Pod  yesterday.     Hypoglycemia: lately has seen sugars in 50s, maybe twice a month  Hypoglycemia awareness: usually asleep, wakes up with sweats and feels like in a  "\"movie\"    Breakfast: varies  Lunch: 12:30-1P  Dinner: 7-8P      ROS  General: no fever or chills  CV: no chest pain   Respiratory: no shortness of breath  MSK: no lower extremity edema  Neuro: no headache or dizziness  See HPI for Endocrine ROS    Past Medical History:   Diagnosis Date    Acute candidiasis of vulva and vagina 01/06/2014    Vaginal candidiasis    Acute vaginitis 01/06/2014    Bacterial vaginosis    Encounter for screening for infections with a predominantly sexual mode of transmission 01/06/2014    Screening examination for venereal disease    Personal history of other diseases of the female genital tract 01/06/2014    History of cervicitis    Personal history of other diseases of the respiratory system 03/02/2016    History of streptococcal pharyngitis    Personal history of other diseases of the respiratory system 03/02/2016    History of pharyngitis    Personal history of other endocrine, nutritional and metabolic disease 01/06/2014    History of diabetes mellitus       Past Surgical History:   Procedure Laterality Date    OTHER SURGICAL HISTORY  03/02/2016    Prior Surgical Procedure Not Done       Social History     Socioeconomic History    Marital status: Single     Spouse name: Not on file    Number of children: Not on file    Years of education: Not on file    Highest education level: Not on file   Occupational History    Not on file   Tobacco Use    Smoking status: Never     Passive exposure: Never    Smokeless tobacco: Never   Substance and Sexual Activity    Alcohol use: Not Currently    Drug use: Never    Sexual activity: Not on file   Other Topics Concern    Not on file   Social History Narrative    Not on file     Social Determinants of Health     Financial Resource Strain: Not on file   Food Insecurity: Not on file   Transportation Needs: Not on file   Physical Activity: Not on file   Stress: Not on file   Social Connections: Not on file   Intimate Partner Violence: Not on " file   Housing Stability: Not on file       Objective    Physical Exam  Blood pressure 129/86, pulse 77, temperature 36.2 °C (97.2 °F), resp. rate 16, weight 71.7 kg (158 lb).  General: not in acute distress  HEENT: DONN, EOMI  Thyroid: no goiter  Neuro: alert and oriented x 3  Feet: no ulcers, erythema, wounds  Monofilament Foot Exam  Right Foot :  Great toe Normal  1st MT Normal  3rd MT Normal  5th MT Normal  Heel Normal    Left Foot  Great toe Normal  1st MT Normal  3rd MT Normal  5th MT Normal  Heel Normal    Current Outpatient Medications:     alcohol swabs pads, medicated, USE TOPICALLY PRIOR TO TESTING OR INJECTING INSULIN., Disp: , Rfl:     OneTouch Delica Plus Lancet 30 gauge misc, USE TO TEST BLOOD SUGAR AS DIRECTED, Disp: , Rfl:     OneTouch Verio test strips strip, USE TO TEST BLOOD SUGAR AS DIRECTED, Disp: , Rfl:     blood sugar diagnostic (ONETOUCH VERIO TEST STRIPS MISC), by in vitro route 3 times a day., Disp: , Rfl:     blood-glucose meter misc, , Disp: , Rfl:     blood-glucose sensor (Dexcom G6 Sensor) device, , Disp: , Rfl:     Dexcom G4 platinum  (Dexcom G6 ) misc, Inject 1 Device under the skin if needed. Use as instructed, Disp: , Rfl:     Dexcom G4 platinum transmitter (Dexcom G6 Transmitter) device, Inject under the skin if needed. Use as instructed, Disp: , Rfl:     ezetimibe (Zetia) 10 mg tablet, Take 1 tablet (10 mg) by mouth once daily., Disp: 90 tablet, Rfl: 1    glucagon (Gvoke) 1 mg/0.2 mL solution, Inject 1 mg under the skin if needed (Use with severe hypoglycemia)., Disp: 0.2 mL, Rfl: 3    glucose 4 gram chewable tablet, Chew if needed for low blood sugar - see comments., Disp: , Rfl:     insulin glargine (Lantus Solostar U-100 Insulin) 100 unit/mL (3 mL) pen, Inject 25 units daily in case of pump failure, Disp: 3 mL, Rfl: 1    insulin lispro (HumaLOG) 100 unit/mL injection, For use in insulin pump. MDD 70 units., Disp: 70 mL, Rfl: 1    insulin pump controller,   "misc, 1 Package continuously., Disp: 1 each, Rfl: 0    ketone blood test strip, , Disp: , Rfl:     lancets 33 gauge misc, , Disp: , Rfl:     levothyroxine (Synthroid, Levoxyl) 50 mcg tablet, Take 1 tablet (50 mcg) by mouth once daily., Disp: 90 tablet, Rfl: 3    omeprazole (PriLOSEC) 40 mg DR capsule, Take 1 capsule (40 mg) by mouth once daily., Disp: , Rfl:     Omnipod 5 G6 Pods, Gen 5, cartridge, 1 each every 3 days., Disp: 30 each, Rfl: 1    ondansetron ODT (Zofran-ODT) 4 mg disintegrating tablet, Take 1 tablet (4 mg) by mouth every 8 hours if needed for vomiting., Disp: , Rfl:     pantoprazole (ProtoNix) 40 mg EC tablet, Take 1 tablet (40 mg) by mouth once daily in the morning. Take before meals., Disp: , Rfl:     pen needle, diabetic 31 gauge x 3/16\" needle, 5 times a day., Disp: , Rfl:     valACYclovir (Valtrex) 500 mg tablet, Take 1 tablet (500 mg) by mouth once daily., Disp: , Rfl:           Assessment/Plan   Matthew Land is a 33 y.o. female with a hx of type 1 diabetes, HLD, and Hashimoto's thyroiditis, here for management of diabetes.    Type 1 diabetes mellitus  HbA1c: 13.4% (09/2023), 11.7% (04/2023), 12.2% (01/2023)  Current regimen: Omnipod 5 + Dexcom G6  Eye exam: due  Urine microalbumin: neg (10/2023)  Podiatry: normal foot exam 1/31/2024  Lipids: HDL 62, , TG 59 (09/2023) on ezetimibe    A1c today: 9.0%.   A1c chronically very elevated but improved since starting Omnipod 5.  Pump download reviewed.  Overall, large fluctuations in blood sugars. Particularly hyperglycemic after meals. Infrequently boluses before meals and if she does, it is after she starts eating.    Reviewed her blood sugars on her pump download with the patient.   Discussed the importance of giving the boluses prior to eating to prevent post-prandial hyperglycemia.   If post-prandial hyperglycemia does not improve despite consistent meal time boluses, will increase ICR.    Discussed keeping a back up supply of long " acting and short acting pens/needles for Humalog if needed, as well as glucagon.      Ezetimibe started at . She is currently taking this, denies previously taking statin.     PLAN:  -see pump settings below (no changes today)  -due for eye exam (reminded patient to schedule for this year)  -continue ezetimibe  -prescribed Lantus pens for back up in case of pump failure (25 units daily)  -has Humalog pens and Gvoke   -Dexcom G6 through [place] verio meter as back up  -check lipids    2. Hypothyoidism  Current regimen: levothyroxine 50 mcg/day (since 10/2023)    Labs from 10/4/2023  TSH 6.13    Has been taking LT4 every day since early December.    PLAN:  -check TSH  -continue levothyroxine 50 mcg/day    Follow-up in 3 months   Uses MyChart  ==========================================  <Insulin Pump>  Type: Omnipod 5  Insulin in pump:         I saw and evaluated the patient. I personally obtained the key and critical portions of the history and physical exam or was physically present for key and critical portions performed by the resident/fellow. I reviewed the resident/fellow's documentation and discussed the patient with the resident/fellow. I agree with the resident/fellow's medical decision making as documented in the note.    Jannette Manuel MD

## 2024-01-31 ENCOUNTER — LAB (OUTPATIENT)
Dept: LAB | Facility: LAB | Age: 34
End: 2024-01-31
Payer: COMMERCIAL

## 2024-01-31 ENCOUNTER — TELEPHONE (OUTPATIENT)
Dept: ENDOCRINOLOGY | Facility: CLINIC | Age: 34
End: 2024-01-31

## 2024-01-31 ENCOUNTER — OFFICE VISIT (OUTPATIENT)
Dept: ENDOCRINOLOGY | Facility: CLINIC | Age: 34
End: 2024-01-31
Payer: COMMERCIAL

## 2024-01-31 VITALS
RESPIRATION RATE: 16 BRPM | HEART RATE: 77 BPM | WEIGHT: 158 LBS | TEMPERATURE: 97.2 F | BODY MASS INDEX: 27.99 KG/M2 | DIASTOLIC BLOOD PRESSURE: 86 MMHG | SYSTOLIC BLOOD PRESSURE: 129 MMHG

## 2024-01-31 DIAGNOSIS — E78.2 MIXED HYPERLIPIDEMIA: Primary | ICD-10-CM

## 2024-01-31 DIAGNOSIS — E03.9 ACQUIRED HYPOTHYROIDISM: ICD-10-CM

## 2024-01-31 DIAGNOSIS — E10.65 TYPE 1 DIABETES MELLITUS WITH HYPERGLYCEMIA, WITH LONG-TERM CURRENT USE OF INSULIN (MULTI): ICD-10-CM

## 2024-01-31 DIAGNOSIS — E78.2 MIXED HYPERLIPIDEMIA: ICD-10-CM

## 2024-01-31 LAB
CHOLEST SERPL-MCNC: 155 MG/DL (ref 0–199)
CHOLESTEROL/HDL RATIO: 2.7
HDLC SERPL-MCNC: 57.3 MG/DL
LDLC SERPL CALC-MCNC: 85 MG/DL
NON HDL CHOLESTEROL: 98 MG/DL (ref 0–149)
POC HEMOGLOBIN A1C: 9 % (ref 4.2–6.5)
TRIGL SERPL-MCNC: 65 MG/DL (ref 0–149)
TSH SERPL-ACNC: 3.79 MIU/L (ref 0.44–3.98)
VLDL: 13 MG/DL (ref 0–40)

## 2024-01-31 PROCEDURE — 83036 HEMOGLOBIN GLYCOSYLATED A1C: CPT | Performed by: STUDENT IN AN ORGANIZED HEALTH CARE EDUCATION/TRAINING PROGRAM

## 2024-01-31 PROCEDURE — 99215 OFFICE O/P EST HI 40 MIN: CPT | Performed by: STUDENT IN AN ORGANIZED HEALTH CARE EDUCATION/TRAINING PROGRAM

## 2024-01-31 PROCEDURE — 3079F DIAST BP 80-89 MM HG: CPT | Performed by: STUDENT IN AN ORGANIZED HEALTH CARE EDUCATION/TRAINING PROGRAM

## 2024-01-31 PROCEDURE — 80061 LIPID PANEL: CPT

## 2024-01-31 PROCEDURE — 84443 ASSAY THYROID STIM HORMONE: CPT

## 2024-01-31 PROCEDURE — 3074F SYST BP LT 130 MM HG: CPT | Performed by: STUDENT IN AN ORGANIZED HEALTH CARE EDUCATION/TRAINING PROGRAM

## 2024-01-31 PROCEDURE — 1036F TOBACCO NON-USER: CPT | Performed by: STUDENT IN AN ORGANIZED HEALTH CARE EDUCATION/TRAINING PROGRAM

## 2024-01-31 PROCEDURE — 95251 CONT GLUC MNTR ANALYSIS I&R: CPT | Performed by: STUDENT IN AN ORGANIZED HEALTH CARE EDUCATION/TRAINING PROGRAM

## 2024-01-31 PROCEDURE — 36415 COLL VENOUS BLD VENIPUNCTURE: CPT

## 2024-01-31 RX ORDER — INSULIN PMP CART,AUT,G6/7,CNTR
EACH SUBCUTANEOUS
COMMUNITY
Start: 2023-12-02 | End: 2024-01-31 | Stop reason: SDUPTHER

## 2024-01-31 RX ORDER — EZETIMIBE 10 MG/1
10 TABLET ORAL NIGHTLY
COMMUNITY
Start: 2023-12-23 | End: 2024-01-31 | Stop reason: SDUPTHER

## 2024-01-31 RX ORDER — LANCETS 33 GAUGE
EACH MISCELLANEOUS
COMMUNITY
Start: 2023-09-27

## 2024-01-31 RX ORDER — INSULIN GLARGINE 100 [IU]/ML
INJECTION, SOLUTION SUBCUTANEOUS
Qty: 3 ML | Refills: 1 | Status: SHIPPED | OUTPATIENT
Start: 2024-01-31

## 2024-01-31 RX ORDER — INSULIN LISPRO 100 [IU]/ML
INJECTION, SOLUTION INTRAVENOUS; SUBCUTANEOUS
Qty: 70 ML | Refills: 1 | Status: SHIPPED | OUTPATIENT
Start: 2024-01-31

## 2024-01-31 RX ORDER — ISOPROPYL ALCOHOL 70 ML/100ML
SWAB TOPICAL
COMMUNITY
Start: 2023-09-27

## 2024-01-31 RX ORDER — BLOOD-GLUCOSE METER
EACH MISCELLANEOUS
COMMUNITY
Start: 2023-09-27

## 2024-01-31 RX ORDER — EZETIMIBE 10 MG/1
10 TABLET ORAL DAILY
Qty: 90 TABLET | Refills: 1 | Status: SHIPPED | OUTPATIENT
Start: 2024-01-31

## 2024-01-31 RX ORDER — INSULIN PMP CART,AUT,G6/7,CNTR
1 EACH SUBCUTANEOUS
Qty: 30 EACH | Refills: 1 | Status: SHIPPED | OUTPATIENT
Start: 2024-01-31

## 2024-01-31 ASSESSMENT — PAIN SCALES - GENERAL: PAINLEVEL: 0-NO PAIN

## 2024-01-31 NOTE — TELEPHONE ENCOUNTER
CMN form for [facility] of medicaid received via email. Completed by me and signed by Dr. Manuel.    Emailed back to sender on 1/31/24 along with 30 days of BG logs

## 2024-01-31 NOTE — TELEPHONE ENCOUNTER
[employer] needs 30 days of downloads. Please email them to me when they are available. I will get them faxed in.

## 2024-02-09 ENCOUNTER — TELEMEDICINE (OUTPATIENT)
Dept: PRIMARY CARE | Facility: CLINIC | Age: 34
End: 2024-02-09
Payer: COMMERCIAL

## 2024-02-09 DIAGNOSIS — J11.1 INFLUENZA: Primary | ICD-10-CM

## 2024-02-09 PROCEDURE — 1036F TOBACCO NON-USER: CPT | Performed by: INTERNAL MEDICINE

## 2024-02-09 PROCEDURE — 3048F LDL-C <100 MG/DL: CPT | Performed by: INTERNAL MEDICINE

## 2024-02-09 PROCEDURE — 99213 OFFICE O/P EST LOW 20 MIN: CPT | Performed by: INTERNAL MEDICINE

## 2024-02-09 ASSESSMENT — ENCOUNTER SYMPTOMS
COUGH: 1
HEADACHES: 1
CHILLS: 1

## 2024-02-09 NOTE — PROGRESS NOTES
Answers submitted by the patient for this visit:  Cough Questionnaire (Submitted on 2/9/2024)  Chief Complaint: Cough  Chronicity: new  Onset: yesterday  Cough characteristics: non-productive  chills: Yes  ear congestion: Yes  headaches: Yes  postnasal drip: Yes    I discussed with the patient the potential benefits and risks of the use of telephone or video-conferencing that differ from in-person services (e.g., limits to patient confidentiality, limitations on the provider’s ability to observe the patient, limitations on the diagnostic tools available). I explained to the patient that I may determine at any point that telehealth services are not appropriate based on the patient’s circumstances and either party may therefore end the service to schedule an alternative in-person service or contact 911 to address a medical emergency. With the understanding of these risks, benefits and alternatives, the patient agreed to use the telephone or video-conferencing platform selected for this virtual session and further the patient confirmed his/her understanding that the services do not guarantee a specific outcome or recovery.  Patient confirms they are currently physically located in the [facility] at this time.     Subjective   Patient ID: Matthew Land is a 33 y.o. female who presents for No chief complaint on file..    HPI     Patient is a 33-year-old female presents with chief complaint of myalgias and fatigue without fever.  She went to the urgent care yesterday and was diagnosed with influenza.  She was subsequently discharged with conservative care instructions.  She has no shortness of breath.  Patient is wondering what she should do    Review of Systems:  Constitutional: No fever or chills  Cardiovascular: no chest pain, no palpitations and no syncope.   Respiratory: cough, no shortness of breath during exertion and no shortness of breath at rest.   Gastrointestinal: no abdominal pain, no nausea and no  vomiting.  Neuro: No Headache, no dizziness    Physical Exam:   Constitutional: Alert and in no acute distress. Well developed, well nourished.   Head and Face: Head and face: Normal.     Eyes: Normal external exam.    Ears, Nose, Mouth, and Throat: External inspection of ears and nose: Normal.  Hearing: Normal.   Neck: No neck mass was observed. Supple.  Pulmonary: No respiratory distress.    Musculoskeletal: Range of motion: Normal.     Skin: Normal skin color and pigmentation, normal skin turgor, and no rash.    Neurologic: Coordination: Normal.    Psychiatric: Judgment and insight: Intact. Mood and affect: Normal.    Assessment/Plan   Problem List Items Addressed This Visit             ICD-10-CM    Influenza - Primary J11.1       1.  Explained that influenza is a virus that does not respond to antibiotics.  Encouraged conservative care.  Drink plenty of fluids.  Tylenol and ibuprofen for muscle aches and fever.  Focus on bland foods.  Recommend distance from family and friends as much as possible to avoid further spread of the virus.  Recommend taking the day off of work.      This Medical recommendation has been made based on the Telephonic/Video conversation and the history is given by the patient, which I as a Physician and the patient also understand that there are limitations given the lack of an in-person Physical Exam. Patient will call back if symptoms don't improve.        A Doctor is always available by phone when the office is closed. Please feel free to call for help with any problem that you feel shouldn't wait until the office re-opens.     Parminder Vargas, DO

## 2024-03-01 NOTE — PROGRESS NOTES
"FUV for diabetes. LV with me  01/2024.    Subjective   Matthew Land is a 33 y.o. female with a hx of type 1 diabetes, HLD, and Hashimoto's thyroiditis.    Dx: age 25  HbA1c: 9.0% (1/31/2024), 13.4% (09/2023), 11.7% (04/2023), 12.2% (01/2023)  Current regimen: Omnipod 5 + Dexcom G6  Complications: none  DKA: admitted 09/2023    SMBG: Dexcom G6    Hypoglycemia: no  Hypoglycemia awareness: usually asleep, wakes up with sweats and feels like in a \"movie\"    Breakfast: varies  Lunch: 12:30-1P  Dinner: 7-8P    Exercise: She walks the treadmill, tries to do this once a week. Patient has 3 boys who are all healthy.     Social Hx:     ROS  General: no fever or chills  CV: no chest pain   Respiratory: no shortness of breath  MSK: no lower extremity edema  Neuro: no headache or dizziness  See HPI for Endocrine ROS    Past Medical History:   Diagnosis Date    Acute candidiasis of vulva and vagina 01/06/2014    Vaginal candidiasis    Acute vaginitis 01/06/2014    Bacterial vaginosis    Encounter for screening for infections with a predominantly sexual mode of transmission 01/06/2014    Screening examination for venereal disease    Personal history of other diseases of the female genital tract 01/06/2014    History of cervicitis    Personal history of other diseases of the respiratory system 03/02/2016    History of streptococcal pharyngitis    Personal history of other diseases of the respiratory system 03/02/2016    History of pharyngitis    Personal history of other endocrine, nutritional and metabolic disease 01/06/2014    History of diabetes mellitus       Past Surgical History:   Procedure Laterality Date    OTHER SURGICAL HISTORY  03/02/2016    Prior Surgical Procedure Not Done       Social History     Socioeconomic History    Marital status: Single     Spouse name: Not on file    Number of children: Not on file    Years of education: Not on file    Highest education level: Not on file   Occupational " History    Not on file   Tobacco Use    Smoking status: Never     Passive exposure: Never    Smokeless tobacco: Never   Substance and Sexual Activity    Alcohol use: Not Currently    Drug use: Never    Sexual activity: Not on file   Other Topics Concern    Not on file   Social History Narrative    Not on file     Social Determinants of Health     Financial Resource Strain: Not on file   Food Insecurity: Not on file   Transportation Needs: Not on file   Physical Activity: Not on file   Stress: Not on file   Social Connections: Not on file   Intimate Partner Violence: Not on file   Housing Stability: Not on file       Objective    Physical Exam  There were no vitals taken for this visit.  General: not in acute distress  HEENT: DONN, EOMI  Thyroid: no goiter  Neuro: alert and oriented x 3  Feet: no ulcers, erythema, wounds  Monofilament Foot Exam  Right Foot :  Great toe Normal  1st MT Normal  3rd MT Normal  5th MT Normal  Heel Normal    Left Foot  Great toe Normal  1st MT Normal  3rd MT Normal  5th MT Normal  Heel Normal    Current Outpatient Medications:     alcohol swabs pads, medicated, USE TOPICALLY PRIOR TO TESTING OR INJECTING INSULIN., Disp: , Rfl:     blood sugar diagnostic (ONETOUCH VERIO TEST STRIPS MISC), by in vitro route 3 times a day., Disp: , Rfl:     blood-glucose meter misc, , Disp: , Rfl:     blood-glucose sensor (Dexcom G6 Sensor) device, , Disp: , Rfl:     Dexcom G4 platinum  (Dexcom G6 ) misc, Inject 1 Device under the skin if needed. Use as instructed, Disp: , Rfl:     Dexcom G4 platinum transmitter (Dexcom G6 Transmitter) device, Inject under the skin if needed. Use as instructed, Disp: , Rfl:     ezetimibe (Zetia) 10 mg tablet, Take 1 tablet (10 mg) by mouth once daily., Disp: 90 tablet, Rfl: 1    glucagon (Gvoke) 1 mg/0.2 mL solution, Inject 1 mg under the skin if needed (Use with severe hypoglycemia)., Disp: 0.2 mL, Rfl: 3    glucose 4 gram chewable tablet, Chew if needed for  "low blood sugar - see comments., Disp: , Rfl:     insulin glargine (Lantus Solostar U-100 Insulin) 100 unit/mL (3 mL) pen, Inject 25 units daily in case of pump failure, Disp: 3 mL, Rfl: 1    insulin lispro (HumaLOG) 100 unit/mL injection, For use in insulin pump. MDD 70 units., Disp: 70 mL, Rfl: 1    insulin pump controller, RF misc, 1 Package continuously., Disp: 1 each, Rfl: 0    ketone blood test strip, , Disp: , Rfl:     lancets 33 gauge misc, , Disp: , Rfl:     levothyroxine (Synthroid, Levoxyl) 50 mcg tablet, Take 1 tablet (50 mcg) by mouth once daily., Disp: 90 tablet, Rfl: 3    omeprazole (PriLOSEC) 40 mg DR capsule, Take 1 capsule (40 mg) by mouth once daily., Disp: , Rfl:     Omnipod 5 G6 Pods, Gen 5, cartridge, 1 each every 3 days., Disp: 30 each, Rfl: 1    ondansetron ODT (Zofran-ODT) 4 mg disintegrating tablet, Take 1 tablet (4 mg) by mouth every 8 hours if needed for vomiting., Disp: , Rfl:     OneTouch Delica Plus Lancet 30 gauge misc, USE TO TEST BLOOD SUGAR AS DIRECTED, Disp: , Rfl:     OneTouch Verio test strips strip, USE TO TEST BLOOD SUGAR AS DIRECTED, Disp: , Rfl:     pantoprazole (ProtoNix) 40 mg EC tablet, Take 1 tablet (40 mg) by mouth once daily in the morning. Take before meals., Disp: , Rfl:     pen needle, diabetic 31 gauge x 3/16\" needle, 5 times a day., Disp: , Rfl:     valACYclovir (Valtrex) 500 mg tablet, Take 1 tablet (500 mg) by mouth once daily., Disp: , Rfl:               Assessment/Plan   Matthew Land is a 33 y.o. female with a hx of type 1 diabetes, HLD, and Hashimoto's thyroiditis, here for management of diabetes.    Type 1 diabetes mellitus  HbA1c: 9.0% (1/31/2024), 13.4% (09/2023), 11.7% (04/2023), 12.2% (01/2023)  Current regimen: Omnipod 5 + Dexcom G6  Eye exam: due  Urine microalbumin: neg (10/2023)  Podiatry: normal foot exam 1/31/2024  Lipids: HDL 57, LDL 85, TG 65 (01/2024) on ezetimibe    Pump download reviewed.  Still not bolusing before meals or bolusing " late.  Scared to take a bolus if pre-meal BG is in the low 100s because it may cause hypoglycemia.  Fasting blood glucose is well controlled.    We reviewed her glycemic patterns together.  Discussed that bolusing before meals help to prevent post-prandial hyperglycemia. Taking the bolus during or after the meal will increase her risk of hypoglycemia.  I told her that it is absolutely necessary to bolus before meals in order to improve her overall glycemic control.   Since she has never bolused before meals, I do not know whether her current ICR is appropriate.   Will reduce ICR today given her fear of bolusing before meals. Discussed that if she boluses before a meal and has hypoglycemia after the meal, this means that we need to reduce her ICR. If she continues to have hyperglycemia after meals despite bolusing before meals, this means we need to increase her ICR.  Asked her to reach out to me if she has hypoglycemia even when bolusing before meals.    PLAN:  -see pump settings below (change ICR 7.5-->10 grams)  -continue ezetimibe  -has Lantus pens for back up in case of pump failure (27 units daily)  -has Humalog pens and Gvoke   -Dexcom G6 through [facility] verio meter as back up  -due for eye exam (reminded patient to schedule for this year)  -refer to clinical pharmacy for closer monitoring    2. Hypothyoidism  Current regimen: levothyroxine 50 mcg/day (since 10/2023)    Labs from 01/2024  TSH 3.79      PLAN:  -continue levothyroxine 50 mcg/day    Follow-up in 4 months   Uses MyChart  ==========================================  <Insulin Pump>  Type: Omnipod 5  Insulin in pump: Humalog      *changed ICR to 10 grams

## 2024-03-07 ENCOUNTER — OFFICE VISIT (OUTPATIENT)
Dept: ENDOCRINOLOGY | Facility: CLINIC | Age: 34
End: 2024-03-07
Payer: COMMERCIAL

## 2024-03-07 VITALS
HEIGHT: 63 IN | WEIGHT: 159 LBS | BODY MASS INDEX: 28.17 KG/M2 | SYSTOLIC BLOOD PRESSURE: 134 MMHG | DIASTOLIC BLOOD PRESSURE: 81 MMHG

## 2024-03-07 DIAGNOSIS — E10.65 TYPE 1 DIABETES MELLITUS WITH HYPERGLYCEMIA, WITH LONG-TERM CURRENT USE OF INSULIN (MULTI): Primary | ICD-10-CM

## 2024-03-07 PROCEDURE — 3048F LDL-C <100 MG/DL: CPT | Performed by: STUDENT IN AN ORGANIZED HEALTH CARE EDUCATION/TRAINING PROGRAM

## 2024-03-07 PROCEDURE — 3075F SYST BP GE 130 - 139MM HG: CPT | Performed by: STUDENT IN AN ORGANIZED HEALTH CARE EDUCATION/TRAINING PROGRAM

## 2024-03-07 PROCEDURE — 99215 OFFICE O/P EST HI 40 MIN: CPT | Performed by: STUDENT IN AN ORGANIZED HEALTH CARE EDUCATION/TRAINING PROGRAM

## 2024-03-07 PROCEDURE — 95251 CONT GLUC MNTR ANALYSIS I&R: CPT | Performed by: STUDENT IN AN ORGANIZED HEALTH CARE EDUCATION/TRAINING PROGRAM

## 2024-03-07 PROCEDURE — 1036F TOBACCO NON-USER: CPT | Performed by: STUDENT IN AN ORGANIZED HEALTH CARE EDUCATION/TRAINING PROGRAM

## 2024-03-07 PROCEDURE — 3079F DIAST BP 80-89 MM HG: CPT | Performed by: STUDENT IN AN ORGANIZED HEALTH CARE EDUCATION/TRAINING PROGRAM

## 2024-04-24 DIAGNOSIS — Z96.41 INSULIN PUMP IN PLACE: ICD-10-CM

## 2024-04-24 DIAGNOSIS — E10.65 TYPE 1 DIABETES MELLITUS WITH HYPERGLYCEMIA, WITH LONG-TERM CURRENT USE OF INSULIN (MULTI): Primary | ICD-10-CM

## 2024-04-24 RX ORDER — BLOOD-GLUCOSE TRANSMITTER
EACH MISCELLANEOUS
Qty: 1 EACH | Refills: 3 | Status: SHIPPED | OUTPATIENT
Start: 2024-04-24

## 2024-04-24 RX ORDER — BLOOD-GLUCOSE SENSOR
EACH MISCELLANEOUS
Qty: 9 EACH | Refills: 3 | Status: SHIPPED | OUTPATIENT
Start: 2024-04-24

## 2024-06-07 ENCOUNTER — CLINICAL SUPPORT (OUTPATIENT)
Dept: ENDOCRINOLOGY | Facility: CLINIC | Age: 34
End: 2024-06-07
Payer: COMMERCIAL

## 2024-06-07 DIAGNOSIS — E10.65 TYPE 1 DIABETES MELLITUS WITH HYPERGLYCEMIA, WITH LONG-TERM CURRENT USE OF INSULIN (MULTI): ICD-10-CM

## 2024-07-24 NOTE — PROGRESS NOTES
"FUV for diabetes. LV with me  03/2024.    Subjective   Matthew Land is a 34 y.o. female with a hx of type 1 diabetes, HLD, and Hashimoto's thyroiditis.    Dx: age 25  HbA1c: 9.6% (7/30/2024), 9.0% (1/31/2024), 13.4% (09/2023), 11.7% (04/2023), 12.2% (01/2023)  Current regimen: Omnipod 5 + Dexcom G6  Complications: none  DKA: admitted 09/2023    SMBG: Dexcom G6    Hypoglycemia: no  Hypoglycemia awareness: usually asleep, wakes up with sweats and feels like in a \"movie\"    Breakfast: varies  Lunch: 12:30-1P  Dinner: 7-8P    Exercise: She walks the treadmill, tries to do this once a week. Patient has 3 boys who are all healthy.     Social Hx:     ROS  General: no fever or chills  CV: no chest pain   Respiratory: no shortness of breath  MSK: no lower extremity edema  Neuro: no headache or dizziness  See HPI for Endocrine ROS    Past Medical History:   Diagnosis Date    Acute candidiasis of vulva and vagina 01/06/2014    Vaginal candidiasis    Acute vaginitis 01/06/2014    Bacterial vaginosis    Encounter for screening for infections with a predominantly sexual mode of transmission 01/06/2014    Screening examination for venereal disease    Personal history of other diseases of the female genital tract 01/06/2014    History of cervicitis    Personal history of other diseases of the respiratory system 03/02/2016    History of streptococcal pharyngitis    Personal history of other diseases of the respiratory system 03/02/2016    History of pharyngitis    Personal history of other endocrine, nutritional and metabolic disease 01/06/2014    History of diabetes mellitus       Past Surgical History:   Procedure Laterality Date    OTHER SURGICAL HISTORY  03/02/2016    Prior Surgical Procedure Not Done       Social History     Socioeconomic History    Marital status: Single     Spouse name: Not on file    Number of children: Not on file    Years of education: Not on file    Highest education level: Not on file " "  Occupational History    Not on file   Tobacco Use    Smoking status: Never     Passive exposure: Never    Smokeless tobacco: Never   Substance and Sexual Activity    Alcohol use: Not Currently    Drug use: Never    Sexual activity: Not on file   Other Topics Concern    Not on file   Social History Narrative    Not on file     Social Determinants of Health     Financial Resource Strain: Not on file   Food Insecurity: Unknown (6/26/2024)    [facility], [facility]    Hunger Vital Sign     Worried About Running Out of Food in the Last Year: Never true     Ran Out of Food in the Last Year: Not on file   Transportation Needs: Not on file   Physical Activity: Not on file   Stress: Not on file   Social Connections: Not on file   Intimate Partner Violence: Not on file   Housing Stability: Not on file       Objective    Physical Exam  Blood pressure 130/80, pulse 74, temperature 36.1 °C (97 °F), resp. rate 17, height 1.6 m (5' 3\"), weight 74.8 kg (165 lb), last menstrual period 07/30/2024.  General: not in acute distress  HEENT: DONN, EOMI  Thyroid: no goiter  Neuro: alert and oriented x 3      Current Outpatient Medications:     alcohol swabs pads, medicated, USE TOPICALLY PRIOR TO TESTING OR INJECTING INSULIN., Disp: , Rfl:     blood sugar diagnostic (ONETOUCH VERIO TEST STRIPS MISC), by in vitro route 3 times a day., Disp: , Rfl:     blood-glucose meter misc, , Disp: , Rfl:     blood-glucose sensor (Dexcom G6 Sensor) device, Change sensor every 10 days, Disp: 9 each, Rfl: 3    blood-glucose transmitter device (Dexcom G6 Transmitter) device, Change sensor every90 days, Disp: 1 each, Rfl: 3    Dexcom G4 platinum  (Dexcom G6 ) misc, Inject 1 Device under the skin if needed. Use as instructed, Disp: , Rfl:     ezetimibe (Zetia) 10 mg tablet, Take 1 tablet (10 mg) by mouth once daily., Disp: 90 tablet, Rfl: 1    glucagon (Gvoke) 1 mg/0.2 mL solution, Inject 1 mg under the skin if " "needed (Use with severe hypoglycemia)., Disp: 0.2 mL, Rfl: 3    glucose 4 gram chewable tablet, Chew if needed for low blood sugar - see comments., Disp: , Rfl:     insulin glargine (Lantus Solostar U-100 Insulin) 100 unit/mL (3 mL) pen, Inject 25 units daily in case of pump failure, Disp: 3 mL, Rfl: 1    insulin lispro (HumaLOG) 100 unit/mL injection, For use in insulin pump. MDD 70 units., Disp: 70 mL, Rfl: 1    insulin pump controller, RF misc, 1 Package continuously., Disp: 1 each, Rfl: 0    ketone blood test strip, , Disp: , Rfl:     lancets 33 gauge misc, , Disp: , Rfl:     levothyroxine (Synthroid, Levoxyl) 50 mcg tablet, Take 1 tablet (50 mcg) by mouth once daily., Disp: 90 tablet, Rfl: 3    omeprazole (PriLOSEC) 40 mg DR capsule, Take 1 capsule (40 mg) by mouth once daily., Disp: , Rfl:     Omnipod 5 G6 Pods, Gen 5, cartridge, 1 each every 3 days., Disp: 30 each, Rfl: 1    ondansetron ODT (Zofran-ODT) 4 mg disintegrating tablet, Take 1 tablet (4 mg) by mouth every 8 hours if needed for vomiting., Disp: , Rfl:     OneTouch Delica Plus Lancet 30 gauge misc, USE TO TEST BLOOD SUGAR AS DIRECTED, Disp: , Rfl:     OneTouch Verio test strips strip, USE TO TEST BLOOD SUGAR AS DIRECTED, Disp: , Rfl:     pantoprazole (ProtoNix) 40 mg EC tablet, Take 1 tablet (40 mg) by mouth once daily in the morning. Take before meals., Disp: , Rfl:     pen needle, diabetic 31 gauge x 3/16\" needle, 5 times a day., Disp: , Rfl:     valACYclovir (Valtrex) 500 mg tablet, Take 1 tablet (500 mg) by mouth once daily., Disp: , Rfl:                 Assessment/Plan   Matthew ASHLEY Land is a 34 y.o. female with a hx of type 1 diabetes, HLD, and Hashimoto's thyroiditis, here for management of diabetes.    Type 1 diabetes mellitus  HbA1c: 9.6% (7/30/2024), 9.0% (1/31/2024), 13.4% (09/2023), 11.7% (04/2023), 12.2% (01/2023)  Current regimen: Omnipod 5 + Dexcom G6  Eye exam: due  Urine microalbumin: neg (10/2023)  Podiatry: normal foot exam " 1/31/2024  Lipids: HDL 57, LDL 85, TG 65 (01/2024) on ezetimibe    A1c: 9.6% today.  Pump download reviewed.  Remains hyperglycemic most of the time.  Sometimes boluses before meals but there are still days she does not bolus at all.  Has frequent snacks. Scared to bolus for every snack.  Recommended she try to wait 2 hours between snacks and bolus for every snack.   The combination of minimal premeal boluses and frequent snacking are causing her to remain persistently hyperglycemic.  When she boluses before a meal, she still has significant post-prandial hyperglycemia.   Will increase ICR and ISF.    PLAN:  -see pump settings below (ICR to 1:8 and ISF to 1:30)  -continue ezetimibe  -has Lantus pens for back up in case of pump failure (27 units daily)  -has Humalog pens and Gvoke   -Dexcom G6 through pharmacy  -[facility] verio meter as back up  -due for eye exam (reminded patient to schedule)  -follow-up with clinical pharmacy in 2 months  -check HbA1c, lipids, CMP, urine microalbumin before next visit    2. Hypothyoidism  Current regimen: levothyroxine 50 mcg/day (since 10/2023)    Labs from 01/2024  TSH 3.79    PLAN:  -check TSH before next visit  -continue levothyroxine 50 mcg/day    Follow-up in 4 months (labs prior)  Uses MyChart  ==========================================  <Insulin Pump>  Type: Omnipod 5  Insulin in pump: Humalog      *changed ICR to 1:8, ISF to 1:30

## 2024-07-31 ENCOUNTER — APPOINTMENT (OUTPATIENT)
Dept: ENDOCRINOLOGY | Facility: CLINIC | Age: 34
End: 2024-07-31
Payer: COMMERCIAL

## 2024-07-31 VITALS
BODY MASS INDEX: 29.23 KG/M2 | WEIGHT: 165 LBS | DIASTOLIC BLOOD PRESSURE: 80 MMHG | HEIGHT: 63 IN | RESPIRATION RATE: 17 BRPM | SYSTOLIC BLOOD PRESSURE: 130 MMHG | TEMPERATURE: 97 F | HEART RATE: 74 BPM

## 2024-07-31 DIAGNOSIS — E06.3 HASHIMOTO'S THYROIDITIS: ICD-10-CM

## 2024-07-31 DIAGNOSIS — E78.2 MIXED HYPERLIPIDEMIA: ICD-10-CM

## 2024-07-31 DIAGNOSIS — E10.65 TYPE 1 DIABETES MELLITUS WITH HYPERGLYCEMIA, WITH LONG-TERM CURRENT USE OF INSULIN (MULTI): ICD-10-CM

## 2024-07-31 LAB — POC HEMOGLOBIN A1C: 9.6 % (ref 4.2–6.5)

## 2024-07-31 PROCEDURE — 3079F DIAST BP 80-89 MM HG: CPT | Performed by: STUDENT IN AN ORGANIZED HEALTH CARE EDUCATION/TRAINING PROGRAM

## 2024-07-31 PROCEDURE — 95251 CONT GLUC MNTR ANALYSIS I&R: CPT | Performed by: STUDENT IN AN ORGANIZED HEALTH CARE EDUCATION/TRAINING PROGRAM

## 2024-07-31 PROCEDURE — 3075F SYST BP GE 130 - 139MM HG: CPT | Performed by: STUDENT IN AN ORGANIZED HEALTH CARE EDUCATION/TRAINING PROGRAM

## 2024-07-31 PROCEDURE — 1036F TOBACCO NON-USER: CPT | Performed by: STUDENT IN AN ORGANIZED HEALTH CARE EDUCATION/TRAINING PROGRAM

## 2024-07-31 PROCEDURE — 99214 OFFICE O/P EST MOD 30 MIN: CPT | Performed by: STUDENT IN AN ORGANIZED HEALTH CARE EDUCATION/TRAINING PROGRAM

## 2024-07-31 PROCEDURE — 3048F LDL-C <100 MG/DL: CPT | Performed by: STUDENT IN AN ORGANIZED HEALTH CARE EDUCATION/TRAINING PROGRAM

## 2024-07-31 PROCEDURE — 83036 HEMOGLOBIN GLYCOSYLATED A1C: CPT | Performed by: STUDENT IN AN ORGANIZED HEALTH CARE EDUCATION/TRAINING PROGRAM

## 2024-07-31 PROCEDURE — 3008F BODY MASS INDEX DOCD: CPT | Performed by: STUDENT IN AN ORGANIZED HEALTH CARE EDUCATION/TRAINING PROGRAM

## 2024-07-31 RX ORDER — INSULIN PMP CART,AUT,G6/7,CNTR
1 EACH SUBCUTANEOUS
Qty: 30 EACH | Refills: 1 | Status: SHIPPED | OUTPATIENT
Start: 2024-07-31

## 2024-07-31 RX ORDER — INSULIN LISPRO 100 [IU]/ML
INJECTION, SOLUTION INTRAVENOUS; SUBCUTANEOUS
Qty: 70 ML | Refills: 1 | Status: SHIPPED | OUTPATIENT
Start: 2024-07-31

## 2024-07-31 RX ORDER — LEVOTHYROXINE SODIUM 50 UG/1
50 TABLET ORAL DAILY
Qty: 90 TABLET | Refills: 3 | Status: SHIPPED | OUTPATIENT
Start: 2024-07-31 | End: 2025-07-31

## 2024-07-31 RX ORDER — EZETIMIBE 10 MG/1
10 TABLET ORAL DAILY
Qty: 90 TABLET | Refills: 1 | Status: SHIPPED | OUTPATIENT
Start: 2024-07-31

## 2024-07-31 ASSESSMENT — PATIENT HEALTH QUESTIONNAIRE - PHQ9
1. LITTLE INTEREST OR PLEASURE IN DOING THINGS: NOT AT ALL
SUM OF ALL RESPONSES TO PHQ9 QUESTIONS 1 AND 2: 0
2. FEELING DOWN, DEPRESSED OR HOPELESS: NOT AT ALL

## 2024-09-04 DIAGNOSIS — K21.9 GASTROESOPHAGEAL REFLUX DISEASE WITHOUT ESOPHAGITIS: ICD-10-CM

## 2024-09-04 RX ORDER — OMEPRAZOLE 40 MG/1
40 CAPSULE, DELAYED RELEASE ORAL DAILY
Qty: 90 CAPSULE | Refills: 0 | Status: SHIPPED | OUTPATIENT
Start: 2024-09-04

## 2024-09-30 ENCOUNTER — APPOINTMENT (OUTPATIENT)
Dept: ENDOCRINOLOGY | Facility: CLINIC | Age: 34
End: 2024-09-30
Payer: COMMERCIAL

## 2024-10-22 ENCOUNTER — TELEPHONE (OUTPATIENT)
Dept: ENDOCRINOLOGY | Facility: CLINIC | Age: 34
End: 2024-10-22
Payer: COMMERCIAL

## 2024-10-22 NOTE — TELEPHONE ENCOUNTER
Patient stopped by office states that the insurance needs a Prior Authorization for the Omipods, patient states she is currently out of them.

## 2024-10-22 NOTE — TELEPHONE ENCOUNTER
Called patient but was unable to reach her.  Will submit PA for Omnipods.  Instructed patient to take basal insulin (27 units daily) and humalog based on carb ratio until she is able to obtain the pods again.   Will also send her a [employer] message.

## 2024-12-04 NOTE — PROGRESS NOTES
"FUV for diabetes. LV with me  07/2024.    Subjective   Matthew Land is a 34 y.o. female with a hx of type 1 diabetes, HLD, and Hashimoto's thyroiditis.    Dx: age 25  HbA1c: 9.6% (7/30/2024), 9.0% (1/31/2024), 13.4% (09/2023), 11.7% (04/2023), 12.2% (01/2023)  Current regimen: Omnipod 5 + Dexcom G6  Complications: none  DKA: admitted 09/2023    SMBG: Dexcom G6    Hypoglycemia: no  Hypoglycemia awareness: usually asleep, wakes up with sweats and feels like in a \"movie\"    Diet:  Drinks: lemonade (29 grams for 8 oz) and other juices    Exercise: She walks the treadmill, tries to do this once a week. Patient has 3 boys who are all healthy.     Social Hx:     ROS  General: no fever or chills  CV: no chest pain   Respiratory: no shortness of breath  MSK: no lower extremity edema  Neuro: no headache or dizziness  See HPI for Endocrine ROS    Past Medical History:   Diagnosis Date    Acute candidiasis of vulva and vagina 01/06/2014    Vaginal candidiasis    Acute vaginitis 01/06/2014    Bacterial vaginosis    Encounter for screening for infections with a predominantly sexual mode of transmission 01/06/2014    Screening examination for venereal disease    Personal history of other diseases of the female genital tract 01/06/2014    History of cervicitis    Personal history of other diseases of the respiratory system 03/02/2016    History of streptococcal pharyngitis    Personal history of other diseases of the respiratory system 03/02/2016    History of pharyngitis    Personal history of other endocrine, nutritional and metabolic disease 01/06/2014    History of diabetes mellitus       Past Surgical History:   Procedure Laterality Date    OTHER SURGICAL HISTORY  03/02/2016    Prior Surgical Procedure Not Done       Social History     Socioeconomic History    Marital status: Single     Spouse name: Not on file    Number of children: Not on file    Years of education: Not on file    Highest education level: " "Not on file   Occupational History    Not on file   Tobacco Use    Smoking status: Never     Passive exposure: Never    Smokeless tobacco: Never   Substance and Sexual Activity    Alcohol use: Not Currently    Drug use: Never    Sexual activity: Not on file   Other Topics Concern    Not on file   Social History Narrative    Not on file     Social Drivers of Health     Financial Resource Strain: Not on file   Food Insecurity: Unknown (6/26/2024)    [facility], [facility]    Hunger Vital Sign     Worried About Running Out of Food in the Last Year: Never true     Ran Out of Food in the Last Year: Not on file   Transportation Needs: Not on file   Physical Activity: Not on file   Stress: Not on file   Social Connections: Not on file   Intimate Partner Violence: Not on file   Housing Stability: Not on file       Objective    Physical Exam  Blood pressure 125/81, pulse 78, height 1.626 m (5' 4\"), weight 74.8 kg (165 lb).  General: not in acute distress  HEENT: CHAZ POP  Thyroid: no goiter  Neuro: alert and oriented x 3      Current Outpatient Medications:     alcohol swabs pads, medicated, USE TOPICALLY PRIOR TO TESTING OR INJECTING INSULIN., Disp: , Rfl:     blood sugar diagnostic (ONETOUCH VERIO TEST STRIPS MISC), by in vitro route 3 times a day., Disp: , Rfl:     blood-glucose meter misc, , Disp: , Rfl:     Dexcom G7 Sensor device, Change sensor every 10 days, Disp: 9 each, Rfl: 3    ezetimibe (Zetia) 10 mg tablet, Take 1 tablet (10 mg) by mouth once daily., Disp: 90 tablet, Rfl: 3    glucagon (Gvoke) 1 mg/0.2 mL solution, Inject 1 mg under the skin if needed (Use with severe hypoglycemia)., Disp: 0.2 mL, Rfl: 3    glucose 4 gram chewable tablet, Chew if needed for low blood sugar - see comments., Disp: , Rfl:     insulin glargine (Lantus Solostar U-100 Insulin) 100 unit/mL (3 mL) pen, Inject 25 units daily in case of pump failure, Disp: 3 mL, Rfl: 1    insulin lispro 100 unit/mL injection, " "For use in insulin pump. MDD 70 units., Disp: 70 mL, Rfl: 1    insulin pump cart,auto,BT,G6/7 (Omnipod 5 G6-G7 Pods, Gen 5,) cartridge, Inject 1 Device under the skin every 3 days., Disp: 30 each, Rfl: 3    ketone blood test strip, , Disp: , Rfl:     lancets 33 gauge misc, , Disp: , Rfl:     levothyroxine (Synthroid, Levoxyl) 50 mcg tablet, Take 1 tablet (50 mcg) by mouth once daily., Disp: 90 tablet, Rfl: 3    omeprazole (PriLOSEC) 40 mg DR capsule, TAKE 1 CAPSULE BY MOUTH ONCE DAILY, Disp: 90 capsule, Rfl: 0    ondansetron ODT (Zofran-ODT) 4 mg disintegrating tablet, Take 1 tablet (4 mg) by mouth every 8 hours if needed for vomiting., Disp: , Rfl:     OneTouch Delica Plus Lancet 30 gauge misc, USE TO TEST BLOOD SUGAR AS DIRECTED, Disp: , Rfl:     OneTouch Verio test strips strip, USE TO TEST BLOOD SUGAR AS DIRECTED, Disp: , Rfl:     pantoprazole (ProtoNix) 40 mg EC tablet, Take 1 tablet (40 mg) by mouth once daily in the morning. Take before meals., Disp: , Rfl:     pen needle, diabetic 31 gauge x 3/16\" needle, 5 times a day., Disp: , Rfl:     valACYclovir (Valtrex) 500 mg tablet, Take 1 tablet (500 mg) by mouth once daily., Disp: , Rfl:                       Assessment/Plan   Matthew Land is a 34 y.o. female with a hx of type 1 diabetes, HLD, and Hashimoto's thyroiditis, here for management of diabetes.    Type 1 diabetes mellitus  HbA1c: 9.6% (7/30/2024), 9.0% (1/31/2024), 13.4% (09/2023), 11.7% (04/2023), 12.2% (01/2023)  Current regimen: Omnipod 5 + Dexcom G6  Eye exam: due-scheduled for 1/9/2025  Urine microalbumin: neg (10/2023)  Podiatry: normal foot exam 1/31/2024  Lipids: HDL 57, LDL 85, TG 65 (01/2024) on ezetimibe    A1c: pending  Pump download reviewed.  Still with very large fluctuations. Significant post-prandial hyperglycemia.  Not bolusing for meals. Rarely boluses at all.   Drinks a lot of juice (lemonade) that she knows she should be bolusing for.  Discussed again that bolusing before meals " is the key to improving her glycemic control.    Labs in process. Did not do urine test.    PLAN:  -see pump settings below   -has Lantus pens for back up in case of pump failure (27 units daily)  -has Humalog pens and Gvoke   -Dexcom G6 through pharmacy-->upgrade to G7  -[facility] verio meter as back up  -follow-up with clinical pharmacy in 1 month  -continue ezetimibe  -check urine microalbumin    2. Hypothyoidism  Current regimen: levothyroxine 50 mcg/day (since 10/2023)    Labs from 01/2024  TSH 3.79    TSH from today pending.    PLAN:  -follow up TSH  -continue levothyroxine 50 mcg/day    Follow-up in 4 months   Uses MyChart  ==========================================  <Insulin Pump>  Type: Omnipod 5  Insulin in pump: Humalog

## 2024-12-10 ENCOUNTER — APPOINTMENT (OUTPATIENT)
Dept: ENDOCRINOLOGY | Facility: CLINIC | Age: 34
End: 2024-12-10
Payer: COMMERCIAL

## 2024-12-10 ENCOUNTER — LAB (OUTPATIENT)
Dept: LAB | Facility: LAB | Age: 34
End: 2024-12-10
Payer: COMMERCIAL

## 2024-12-10 VITALS
SYSTOLIC BLOOD PRESSURE: 125 MMHG | BODY MASS INDEX: 28.17 KG/M2 | WEIGHT: 165 LBS | DIASTOLIC BLOOD PRESSURE: 81 MMHG | HEIGHT: 64 IN | HEART RATE: 78 BPM

## 2024-12-10 DIAGNOSIS — E10.65 TYPE 1 DIABETES MELLITUS WITH HYPERGLYCEMIA, WITH LONG-TERM CURRENT USE OF INSULIN: Primary | ICD-10-CM

## 2024-12-10 DIAGNOSIS — E10.65 TYPE 1 DIABETES MELLITUS WITH HYPERGLYCEMIA, WITH LONG-TERM CURRENT USE OF INSULIN: ICD-10-CM

## 2024-12-10 DIAGNOSIS — E06.3 HASHIMOTO'S THYROIDITIS: Primary | ICD-10-CM

## 2024-12-10 DIAGNOSIS — E78.2 MIXED HYPERLIPIDEMIA: ICD-10-CM

## 2024-12-10 DIAGNOSIS — E06.3 HASHIMOTO'S THYROIDITIS: ICD-10-CM

## 2024-12-10 LAB
CHOLEST SERPL-MCNC: 164 MG/DL (ref 0–199)
CHOLESTEROL/HDL RATIO: 3.1
EST. AVERAGE GLUCOSE BLD GHB EST-MCNC: 269 MG/DL
HBA1C MFR BLD: 11 %
HDLC SERPL-MCNC: 52.9 MG/DL
LDLC SERPL CALC-MCNC: 91 MG/DL
NON HDL CHOLESTEROL: 111 MG/DL (ref 0–149)
TRIGL SERPL-MCNC: 100 MG/DL (ref 0–149)
TSH SERPL-ACNC: 6.6 MIU/L (ref 0.44–3.98)
VLDL: 20 MG/DL (ref 0–40)

## 2024-12-10 PROCEDURE — 3074F SYST BP LT 130 MM HG: CPT | Performed by: STUDENT IN AN ORGANIZED HEALTH CARE EDUCATION/TRAINING PROGRAM

## 2024-12-10 PROCEDURE — 84443 ASSAY THYROID STIM HORMONE: CPT

## 2024-12-10 PROCEDURE — 95251 CONT GLUC MNTR ANALYSIS I&R: CPT | Performed by: STUDENT IN AN ORGANIZED HEALTH CARE EDUCATION/TRAINING PROGRAM

## 2024-12-10 PROCEDURE — 3079F DIAST BP 80-89 MM HG: CPT | Performed by: STUDENT IN AN ORGANIZED HEALTH CARE EDUCATION/TRAINING PROGRAM

## 2024-12-10 PROCEDURE — 3048F LDL-C <100 MG/DL: CPT | Performed by: STUDENT IN AN ORGANIZED HEALTH CARE EDUCATION/TRAINING PROGRAM

## 2024-12-10 PROCEDURE — 83036 HEMOGLOBIN GLYCOSYLATED A1C: CPT

## 2024-12-10 PROCEDURE — 3008F BODY MASS INDEX DOCD: CPT | Performed by: STUDENT IN AN ORGANIZED HEALTH CARE EDUCATION/TRAINING PROGRAM

## 2024-12-10 PROCEDURE — 36415 COLL VENOUS BLD VENIPUNCTURE: CPT

## 2024-12-10 PROCEDURE — 3046F HEMOGLOBIN A1C LEVEL >9.0%: CPT | Performed by: STUDENT IN AN ORGANIZED HEALTH CARE EDUCATION/TRAINING PROGRAM

## 2024-12-10 PROCEDURE — 80061 LIPID PANEL: CPT

## 2024-12-10 PROCEDURE — 99215 OFFICE O/P EST HI 40 MIN: CPT | Performed by: STUDENT IN AN ORGANIZED HEALTH CARE EDUCATION/TRAINING PROGRAM

## 2024-12-10 RX ORDER — LEVOTHYROXINE SODIUM 75 UG/1
TABLET ORAL
Qty: 90 TABLET | Refills: 3 | Status: SHIPPED | OUTPATIENT
Start: 2024-12-10

## 2024-12-10 RX ORDER — INSULIN LISPRO 100 [IU]/ML
INJECTION, SOLUTION INTRAVENOUS; SUBCUTANEOUS
Qty: 70 ML | Refills: 1 | Status: SHIPPED | OUTPATIENT
Start: 2024-12-10

## 2024-12-10 RX ORDER — INSULIN PMP CART,AUT,G6/7,CNTR
1 EACH SUBCUTANEOUS
Qty: 30 EACH | Refills: 3 | Status: SHIPPED | OUTPATIENT
Start: 2024-12-10

## 2024-12-10 RX ORDER — BLOOD-GLUCOSE SENSOR
EACH MISCELLANEOUS
Qty: 9 EACH | Refills: 3 | Status: SHIPPED | OUTPATIENT
Start: 2024-12-10

## 2024-12-10 RX ORDER — EZETIMIBE 10 MG/1
10 TABLET ORAL DAILY
Qty: 90 TABLET | Refills: 3 | Status: SHIPPED | OUTPATIENT
Start: 2024-12-10

## 2024-12-10 ASSESSMENT — PATIENT HEALTH QUESTIONNAIRE - PHQ9
1. LITTLE INTEREST OR PLEASURE IN DOING THINGS: NOT AT ALL
2. FEELING DOWN, DEPRESSED OR HOPELESS: NOT AT ALL
SUM OF ALL RESPONSES TO PHQ9 QUESTIONS 1 AND 2: 0

## 2024-12-10 ASSESSMENT — ENCOUNTER SYMPTOMS
LOSS OF SENSATION IN FEET: 0
OCCASIONAL FEELINGS OF UNSTEADINESS: 0
DEPRESSION: 0

## 2025-01-09 ENCOUNTER — APPOINTMENT (OUTPATIENT)
Dept: OPHTHALMOLOGY | Facility: CLINIC | Age: 35
End: 2025-01-09
Payer: COMMERCIAL

## 2025-01-09 DIAGNOSIS — Z96.1 PSEUDOPHAKIA OF BOTH EYES: ICD-10-CM

## 2025-01-09 DIAGNOSIS — E10.9 CONTROLLED DIABETES MELLITUS TYPE 1 WITHOUT COMPLICATIONS: Primary | ICD-10-CM

## 2025-01-09 PROCEDURE — 92014 COMPRE OPH EXAM EST PT 1/>: CPT | Performed by: OPHTHALMOLOGY

## 2025-01-09 ASSESSMENT — TONOMETRY
IOP_METHOD: GOLDMANN APPLANATION
OS_IOP_MMHG: 14
OD_IOP_MMHG: 13

## 2025-01-09 ASSESSMENT — REFRACTION_WEARINGRX
OD_AXIS: 170
OD_SPHERE: PLANO
OD_CYLINDER: -1.25
OS_CYLINDER: SPHERE
OS_SPHERE: +1.00

## 2025-01-09 ASSESSMENT — REFRACTION_MANIFEST
OD_SPHERE: +0.25
OS_CYLINDER: SPHERE
OD_CYLINDER: -1.25
OS_SPHERE: +1.25
OD_AXIS: 180

## 2025-01-09 ASSESSMENT — EXTERNAL EXAM - RIGHT EYE: OD_EXAM: NORMAL

## 2025-01-09 ASSESSMENT — CONF VISUAL FIELD
OS_SUPERIOR_NASAL_RESTRICTION: 0
OD_NORMAL: 1
OD_SUPERIOR_TEMPORAL_RESTRICTION: 0
OD_INFERIOR_TEMPORAL_RESTRICTION: 0
OD_SUPERIOR_NASAL_RESTRICTION: 0
OD_INFERIOR_NASAL_RESTRICTION: 0
OS_INFERIOR_NASAL_RESTRICTION: 0
OS_NORMAL: 1
OS_SUPERIOR_TEMPORAL_RESTRICTION: 0
OS_INFERIOR_TEMPORAL_RESTRICTION: 0

## 2025-01-09 ASSESSMENT — SLIT LAMP EXAM - LIDS
COMMENTS: GOOD POSITION
COMMENTS: GOOD POSITION

## 2025-01-09 ASSESSMENT — EXTERNAL EXAM - LEFT EYE: OS_EXAM: NORMAL

## 2025-01-09 ASSESSMENT — VISUAL ACUITY
OD_SC: 20/30
OS_SC: 20/25
METHOD: SNELLEN - LINEAR

## 2025-01-09 ASSESSMENT — CUP TO DISC RATIO
OD_RATIO: .3
OS_RATIO: .3

## 2025-01-09 NOTE — PROGRESS NOTES
Assessment/Plan   Diagnoses and all orders for this visit:  Controlled diabetes mellitus type 1 without complications  no retinopathy observed on exam today od/os, pt ed to continue good BGlc, blood pressure and lipid control, rtc with any changes in vision, otherwise monitor 1 year   Pseudophakia of both eyes  Capsular phimosis, but IOL and vision stable

## 2025-01-10 ENCOUNTER — APPOINTMENT (OUTPATIENT)
Dept: ENDOCRINOLOGY | Facility: CLINIC | Age: 35
End: 2025-01-10
Payer: COMMERCIAL

## 2025-01-10 DIAGNOSIS — E10.65 TYPE 1 DIABETES MELLITUS WITH HYPERGLYCEMIA, WITH LONG-TERM CURRENT USE OF INSULIN: Primary | ICD-10-CM

## 2025-01-10 PROCEDURE — 95251 CONT GLUC MNTR ANALYSIS I&R: CPT | Performed by: PHARMACIST

## 2025-01-10 PROCEDURE — 99211 OFF/OP EST MAY X REQ PHY/QHP: CPT | Performed by: PHARMACIST

## 2025-01-10 RX ORDER — INSULIN PMP CART,AUT,G6/7,CNTR
1 EACH SUBCUTANEOUS EVERY OTHER DAY
Qty: 30 EACH | Refills: 3 | Status: SHIPPED | OUTPATIENT
Start: 2025-01-10 | End: 2025-04-10

## 2025-01-10 NOTE — PATIENT INSTRUCTIONS
Changed manual basal rate to 1 unit/hr.  Focus on entering carbs 15 minutes before a meal.  Do no take off pod overnight.

## 2025-01-10 NOTE — PROGRESS NOTES
Clinical Pharmacy Team met with Matthew Land regarding a consultation for diabetes management thanks to Dr. Manuel. Below is a summary of our conversation and recommendations:    Recommendations:  Reduced manual basal rate to 1 unit/hr to reflect automated insulin requirements  2. Discussed importance of carrying Omnipod controller with her and entering carbs before each meal  3. Discussed danger of not wearing pod overnight. Advised to use Lantus/Humalog if this happens again.   ________________________________________________________________________      Allergies   Allergen Reactions    Ciprofloxacin Chills     Vomiting     Patient goal from Dr. Manuel: enter carbs before she eats, not during or after. Thinks she has been doing okay at this goal, but could do better.  Does not always carry reader- especially at work. Notices pump switches into manual mode. Tries to switch it back when she notices, but sometimes stays in manual mode all day.  Lows tend to occur when she is on her way from from work- 4-5pm or overnight- 3-4am. Is fearful of going low. Does not like the way she feels.   Went ~18 hours without insulin because she ran out of pods and needed to wait for pharmacy to open on Sunday. Felt very sick.   Unable to identify certain foods that make her go high.       Objective     There were no vitals taken for this visit.    Diabetes Pharmacotherapy:    Omnipod 5 with insulin lispro      Lab Review  Lab Results   Component Value Date    BILITOT 0.6 10/04/2023    CALCIUM 9.0 10/04/2023    CO2 29 10/04/2023     10/04/2023    CREATININE 0.64 10/04/2023    GLUCOSE 127 (H) 10/04/2023    ALKPHOS 78 10/04/2023    K 4.7 10/04/2023    PROT 6.8 10/04/2023     10/04/2023    AST 26 10/04/2023    ALT 24 10/04/2023    BUN 9 10/04/2023    ANIONGAP 11 10/04/2023    MG 1.64 09/26/2023    PHOS 3.0 09/26/2023    ALBUMIN 3.7 10/04/2023    LIPASE 11 09/16/2021    GFRF >90 09/26/2023     Lab Results   Component  "Value Date    TRIG 100 12/10/2024    CHOL 164 12/10/2024    LDLCALC 91 12/10/2024    HDL 52.9 12/10/2024     Lab Results   Component Value Date    HGBA1C 11.0 (H) 12/10/2024    HGBA1C 9.6 (A) 2024    HGBA1C 9.0 (A) 2024     The ASCVD Risk score (Francisco DK, et al., 2019) failed to calculate for the following reasons:    The 2019 ASCVD risk score is only valid for ages 40 to 79      Monitoring         Assessment/Plan     The patient reports today for a diabetes consultation. Pump report demonstrates insufficient patient interaction with pump. Patient admits she often forgets to bolus with meals or boluses after she is done eating. Often does not carry Omnipod controller with her at work or home. Tends to interact with pump when her CGM is alerting her that she is high. Encouraged patient to carry PDM with her and to administer bolus doses 15 minutes before she eats. Discussed patient fear of hypoglycemia. Educated patient on \"smart\" features of automated mode including automatic shut-off if the blood sugars are trending low.    Discussed danger of not wearing insulin pump for prolonged periods of time as a T1 diabetic. Patient describes event where she ran out of pods and could not get a refill for ~18 hours. Did not use any insulin during this time. Advised patient to use Lantus an Humalog injections if this ever happens again. Will resend Omnipod script allowing for pod change every 2 days.    A minimum of 72 hours of CGM data was used to make therapeutic decisions today.    PATIENT EDUCATION/GOALS  Current A1c: [ 11 % ] [ 12/10/24 ]     Goals  Fasting B - 130 mg/dL  Postprandial BG: less than 180 mg/dL  A1c: less than 7%    Type of encounter: [ in person ]    Provided counseling on lifestyle modifications, medications, and self-monitoring. Patient has no additional questions at this time. Pharmacy to follow up in 6 weeks. Please reach out with any questions. Thank you.       Annabelle Fried, " PharmD    Provider on site: Pearl Paul CNP  Continue all meds under the continuation of care with the referring provider and clinical pharmacy team.

## 2025-02-17 ENCOUNTER — APPOINTMENT (OUTPATIENT)
Dept: ENDOCRINOLOGY | Facility: CLINIC | Age: 35
End: 2025-02-17
Payer: COMMERCIAL

## 2025-02-17 DIAGNOSIS — E10.65 TYPE 1 DIABETES MELLITUS WITH HYPERGLYCEMIA, WITH LONG-TERM CURRENT USE OF INSULIN: ICD-10-CM

## 2025-03-07 DIAGNOSIS — K21.9 GASTROESOPHAGEAL REFLUX DISEASE WITHOUT ESOPHAGITIS: ICD-10-CM

## 2025-03-07 RX ORDER — OMEPRAZOLE 40 MG/1
40 CAPSULE, DELAYED RELEASE ORAL DAILY
Qty: 90 CAPSULE | Refills: 0 | Status: SHIPPED | OUTPATIENT
Start: 2025-03-07

## 2025-04-03 NOTE — PROGRESS NOTES
"FUV for diabetes. LV with me  12/2024.    Subjective   Matthew Land is a 34 y.o. female with a hx of type 1 diabetes, HLD, and Hashimoto's thyroiditis.    Dx: age 25  HbA1c: 9.8% (04/2025), 11.0% (12/2024), 9.6% (7/30/2024), 9.0% (1/31/2024), 13.4% (09/2023), 11.7% (04/2023), 12.2% (01/2023)  Current regimen: Omnipod 5 + Dexcom G6  Complications: none  DKA: admitted 09/2023    SMBG: Dexcom G6    Hypoglycemia: no  Hypoglycemia awareness: usually asleep, wakes up with sweats and feels like in a \"movie\"    Diet:  Drinks: lemonade (29 grams for 8 oz) and other juices    Exercise: She walks the treadmill, tries to do this once a week. Patient has 3 boys who are all healthy.     Social Hx:     ROS  General: no fever or chills  CV: no chest pain   Respiratory: no shortness of breath  MSK: no lower extremity edema  Neuro: no headache or dizziness  See HPI for Endocrine ROS    Past Medical History:   Diagnosis Date    Acute candidiasis of vulva and vagina 01/06/2014    Vaginal candidiasis    Acute vaginitis 01/06/2014    Bacterial vaginosis    Encounter for screening for infections with a predominantly sexual mode of transmission 01/06/2014    Screening examination for venereal disease    Personal history of other diseases of the female genital tract 01/06/2014    History of cervicitis    Personal history of other diseases of the respiratory system 03/02/2016    History of streptococcal pharyngitis    Personal history of other diseases of the respiratory system 03/02/2016    History of pharyngitis    Personal history of other endocrine, nutritional and metabolic disease 01/06/2014    History of diabetes mellitus       Past Surgical History:   Procedure Laterality Date    OTHER SURGICAL HISTORY  03/02/2016    Prior Surgical Procedure Not Done       Social History     Socioeconomic History    Marital status: Single     Spouse name: Not on file    Number of children: Not on file    Years of education: Not on " file    Highest education level: Not on file   Occupational History    Not on file   Tobacco Use    Smoking status: Never     Passive exposure: Never    Smokeless tobacco: Never   Substance and Sexual Activity    Alcohol use: Not Currently    Drug use: Never    Sexual activity: Not on file   Other Topics Concern    Not on file   Social History Narrative    Not on file     Social Drivers of Health     Financial Resource Strain: Not on file   Food Insecurity: Unknown (6/26/2024)    [facility]    Hunger Vital Sign     Worried About Running Out of Food in the Last Year: Never true     Ran Out of Food in the Last Year: Not on file   Transportation Needs: Not on file   Physical Activity: Not on file   Stress: Not on file   Social Connections: Not on file   Intimate Partner Violence: Not on file   Housing Stability: Not on file       Objective    Physical Exam  There were no vitals taken for this visit.  General: not in acute distress  HEENT: DONN, EOMI  Thyroid: no goiter  Neuro: alert and oriented x 3      Current Outpatient Medications:     alcohol swabs pads, medicated, USE TOPICALLY PRIOR TO TESTING OR INJECTING INSULIN., Disp: , Rfl:     blood sugar diagnostic (ONETOUCH VERIO TEST STRIPS MISC), by in vitro route 3 times a day., Disp: , Rfl:     blood-glucose meter misc, , Disp: , Rfl:     Dexcom G7 Sensor device, Change sensor every 10 days, Disp: 9 each, Rfl: 3    ezetimibe (Zetia) 10 mg tablet, Take 1 tablet (10 mg) by mouth once daily., Disp: 90 tablet, Rfl: 3    glucagon (Gvoke) 1 mg/0.2 mL solution, Inject 1 mg under the skin if needed (Use with severe hypoglycemia)., Disp: 0.2 mL, Rfl: 3    glucose 4 gram chewable tablet, Chew if needed for low blood sugar - see comments., Disp: , Rfl:     insulin glargine (Lantus Solostar U-100 Insulin) 100 unit/mL (3 mL) pen, Inject 25 units daily in case of pump failure, Disp: 3 mL, Rfl: 1    insulin lispro 100 unit/mL injection, For use  "in insulin pump. MDD 70 units., Disp: 70 mL, Rfl: 1    insulin pump cart,auto,BT,G6/7 (Omnipod 5 G6-G7 Pods, Gen 5,) cartridge, Inject 1 each under the skin every other day., Disp: 30 each, Rfl: 3    ketone blood test strip, , Disp: , Rfl:     lancets 33 gauge misc, , Disp: , Rfl:     levothyroxine (Synthroid, Levoxyl) 75 mcg tablet, Take 1 tab daily (first thing in the morning on an empty stomach, by itself with water. Wait 30 mins before eating/drinking/taking other meds. Wait 4 hours before taking calcium/iron/multivitamins, Disp: 90 tablet, Rfl: 3    omeprazole (PriLOSEC) 40 mg DR capsule, Take 1 capsule (40 mg) by mouth once daily., Disp: 90 capsule, Rfl: 0    ondansetron ODT (Zofran-ODT) 4 mg disintegrating tablet, Take 1 tablet (4 mg) by mouth every 8 hours if needed for vomiting., Disp: , Rfl:     OneTouch Delica Plus Lancet 30 gauge misc, USE TO TEST BLOOD SUGAR AS DIRECTED, Disp: , Rfl:     OneTouch Verio test strips strip, USE TO TEST BLOOD SUGAR AS DIRECTED, Disp: , Rfl:     pantoprazole (ProtoNix) 40 mg EC tablet, Take 1 tablet (40 mg) by mouth once daily in the morning. Take before meals., Disp: , Rfl:     pen needle, diabetic 31 gauge x 3/16\" needle, 5 times a day., Disp: , Rfl:     valACYclovir (Valtrex) 500 mg tablet, Take 1 tablet (500 mg) by mouth once daily., Disp: , Rfl:                           Assessment/Plan   Brigettecarolynn Land is a 34 y.o. female with a hx of type 1 diabetes, HLD, and Hashimoto's thyroiditis, here for management of diabetes.    Type 1 diabetes mellitus  HbA1c: 9.8% (04/2025), 11.0% (12/2024), 9.6% (7/30/2024), 9.0% (1/31/2024), 13.4% (09/2023), 11.7% (04/2023), 12.2% (01/2023)  Current regimen: Omnipod 5 + Dexcom G6  Eye exam: no DR (01/2025)  Urine microalbumin: neg (10/2023)  Podiatry: normal foot exam 1/31/2024  Lipids: HDL 53, LDL 91,  (12/2024) on ezetimibe    A1c: 9.8% today.  Pump download reviewed.  Still with significant post-prandial hyperglycemia due to " not bolusing before meals.  Her main problem has always been forgetting to bolus before meals.  She becomes hyperglycemic after meals and then is kicked out of auto mode.  Once again, stressed the importance of premeal bolusing.  Her ICR may need adjustment, but it is difficult to determine her actual insulin requirement at this time.  Discussed that I would be able to better adjustment her pump settings if she boluses consistently before meals.  Will have her follow up with clinical pharmacy in 1 month.    Having fasting hypoglycemia when in manual mode.  Will decrease manual basal rate slightly.    PLAN:  -see pump settings below   -has Lantus pens for back up in case of pump failure (25 units daily)  -has Gvoke   -Dexcom G6 through pharmacy-->upgrade to G7  -Onetouch verio meter as back up  -continue ezetimibe  -follow-up with clinical pharmacy in 1 month  -check urine microalbumin    2. Hypothyoidism  Current regimen: levothyroxine 50 mcg/day    Labs from 12/2024  TSH 6.60    Increased levothyroxine from 50 to 75 mcg/day after labs above but she never started the 75 mcg dose.  Still on the 50 mcg/day.  Will repeat TSH.    PLAN:  -check TSH  -continue levothyroxine 50 mcg/day    Follow-up in 3 months with Dr. Naik ([facility])  Uses MyChart  ==========================================  <Insulin Pump>  Type: Omnipod 5  Insulin in pump: Humalog    Decrease basal rate to 0.9 units/h

## 2025-04-10 ENCOUNTER — APPOINTMENT (OUTPATIENT)
Dept: ENDOCRINOLOGY | Facility: CLINIC | Age: 35
End: 2025-04-10
Payer: COMMERCIAL

## 2025-04-10 ENCOUNTER — OFFICE VISIT (OUTPATIENT)
Dept: PRIMARY CARE | Facility: CLINIC | Age: 35
End: 2025-04-10
Payer: COMMERCIAL

## 2025-04-10 VITALS
DIASTOLIC BLOOD PRESSURE: 81 MMHG | HEIGHT: 60 IN | OXYGEN SATURATION: 98 % | SYSTOLIC BLOOD PRESSURE: 117 MMHG | HEART RATE: 78 BPM | BODY MASS INDEX: 32.2 KG/M2 | WEIGHT: 164 LBS

## 2025-04-10 VITALS
BODY MASS INDEX: 32.59 KG/M2 | DIASTOLIC BLOOD PRESSURE: 78 MMHG | SYSTOLIC BLOOD PRESSURE: 122 MMHG | HEIGHT: 60 IN | HEART RATE: 66 BPM | TEMPERATURE: 97.6 F | WEIGHT: 166 LBS

## 2025-04-10 DIAGNOSIS — E10.65 TYPE 1 DIABETES MELLITUS WITH HYPERGLYCEMIA, WITH LONG-TERM CURRENT USE OF INSULIN: ICD-10-CM

## 2025-04-10 DIAGNOSIS — Z00.00 HEALTH CARE MAINTENANCE: Primary | ICD-10-CM

## 2025-04-10 DIAGNOSIS — Z23 NEED FOR PROPHYLACTIC VACCINATION AGAINST STREPTOCOCCUS PNEUMONIAE (PNEUMOCOCCUS): ICD-10-CM

## 2025-04-10 DIAGNOSIS — E03.9 ADULT HYPOTHYROIDISM: ICD-10-CM

## 2025-04-10 DIAGNOSIS — E78.2 MIXED HYPERLIPIDEMIA: ICD-10-CM

## 2025-04-10 DIAGNOSIS — E03.9 HYPOTHYROIDISM (ACQUIRED): Primary | ICD-10-CM

## 2025-04-10 LAB — POC HEMOGLOBIN A1C: 9.8 % (ref 4.2–6.5)

## 2025-04-10 PROCEDURE — 3008F BODY MASS INDEX DOCD: CPT | Performed by: STUDENT IN AN ORGANIZED HEALTH CARE EDUCATION/TRAINING PROGRAM

## 2025-04-10 PROCEDURE — 90471 IMMUNIZATION ADMIN: CPT | Performed by: INTERNAL MEDICINE

## 2025-04-10 PROCEDURE — 3008F BODY MASS INDEX DOCD: CPT | Performed by: INTERNAL MEDICINE

## 2025-04-10 PROCEDURE — 3078F DIAST BP <80 MM HG: CPT | Performed by: STUDENT IN AN ORGANIZED HEALTH CARE EDUCATION/TRAINING PROGRAM

## 2025-04-10 PROCEDURE — 3074F SYST BP LT 130 MM HG: CPT | Performed by: INTERNAL MEDICINE

## 2025-04-10 PROCEDURE — 99395 PREV VISIT EST AGE 18-39: CPT | Performed by: INTERNAL MEDICINE

## 2025-04-10 PROCEDURE — 3074F SYST BP LT 130 MM HG: CPT | Performed by: STUDENT IN AN ORGANIZED HEALTH CARE EDUCATION/TRAINING PROGRAM

## 2025-04-10 PROCEDURE — 1036F TOBACCO NON-USER: CPT | Performed by: INTERNAL MEDICINE

## 2025-04-10 PROCEDURE — 1036F TOBACCO NON-USER: CPT | Performed by: STUDENT IN AN ORGANIZED HEALTH CARE EDUCATION/TRAINING PROGRAM

## 2025-04-10 PROCEDURE — 3079F DIAST BP 80-89 MM HG: CPT | Performed by: INTERNAL MEDICINE

## 2025-04-10 PROCEDURE — 99215 OFFICE O/P EST HI 40 MIN: CPT | Performed by: STUDENT IN AN ORGANIZED HEALTH CARE EDUCATION/TRAINING PROGRAM

## 2025-04-10 PROCEDURE — 90677 PCV20 VACCINE IM: CPT | Performed by: INTERNAL MEDICINE

## 2025-04-10 PROCEDURE — G8433 SCR FOR DEP NOT CPT DOC RSN: HCPCS | Performed by: INTERNAL MEDICINE

## 2025-04-10 PROCEDURE — 83036 HEMOGLOBIN GLYCOSYLATED A1C: CPT | Performed by: STUDENT IN AN ORGANIZED HEALTH CARE EDUCATION/TRAINING PROGRAM

## 2025-04-10 PROCEDURE — 95251 CONT GLUC MNTR ANALYSIS I&R: CPT | Performed by: STUDENT IN AN ORGANIZED HEALTH CARE EDUCATION/TRAINING PROGRAM

## 2025-04-10 PROCEDURE — 3046F HEMOGLOBIN A1C LEVEL >9.0%: CPT | Performed by: INTERNAL MEDICINE

## 2025-04-10 PROCEDURE — 3046F HEMOGLOBIN A1C LEVEL >9.0%: CPT | Performed by: STUDENT IN AN ORGANIZED HEALTH CARE EDUCATION/TRAINING PROGRAM

## 2025-04-10 RX ORDER — INSULIN GLARGINE 100 [IU]/ML
INJECTION, SOLUTION SUBCUTANEOUS
Qty: 15 ML | Refills: 1 | Status: SHIPPED | OUTPATIENT
Start: 2025-04-10

## 2025-04-10 RX ORDER — EZETIMIBE 10 MG/1
10 TABLET ORAL DAILY
Qty: 90 TABLET | Refills: 3 | Status: SHIPPED | OUTPATIENT
Start: 2025-04-10

## 2025-04-10 RX ORDER — GLUCAGON INJECTION, SOLUTION 1 MG/.2ML
1 INJECTION, SOLUTION SUBCUTANEOUS AS NEEDED
Qty: 0.2 ML | Refills: 3 | Status: SHIPPED | OUTPATIENT
Start: 2025-04-10

## 2025-04-10 RX ORDER — INSULIN LISPRO 100 [IU]/ML
INJECTION, SOLUTION INTRAVENOUS; SUBCUTANEOUS
Qty: 70 ML | Refills: 3 | Status: SHIPPED | OUTPATIENT
Start: 2025-04-10

## 2025-04-10 ASSESSMENT — PATIENT HEALTH QUESTIONNAIRE - PHQ9
SUM OF ALL RESPONSES TO PHQ9 QUESTIONS 1 AND 2: 0
2. FEELING DOWN, DEPRESSED OR HOPELESS: NOT AT ALL
1. LITTLE INTEREST OR PLEASURE IN DOING THINGS: NOT AT ALL

## 2025-04-10 ASSESSMENT — ENCOUNTER SYMPTOMS: DEPRESSION: 0

## 2025-04-10 NOTE — PROGRESS NOTES
Subjective   Patient ID: Matthew Land is a 34 y.o. female who presents for Annual Exam.        HPI     Patient is a 34-year-old female with past medical history of hypothyroidism diabetes mellitus type 1 presents for wellness.  No specific complaints at this time.  Has 3 children.  Sexually active and monogamous.  Works for [employer].  No specific complaints at this time.  Last A1c 9.8.  Follows with endocrinology.  Has an insulin pump.  Has not been bolusing and only using the basal insulin.  Carb to insulin ratio has recently been increased.  Follows with endocrinology.    No complaints this time.  Denies illicit substances or smoking.  Alcohol occasionally.      Review of Systems  Constitutional: No fever or chills, No Night Sweats  Eyes: No Blurry Vision or Eye sight problems  ENT: No Nasal Discharge, Hoarseness, sore throat  Cardiovascular: no chest pain, no palpitations and no syncope.   Respiratory: no cough, no shortness of breath during exertion and no shortness of breath at rest.   Gastrointestinal: no abdominal pain, no nausea and no vomiting.   : No vaginal discharge, burning with urination, no blood in urine or stools  Skin: No Skin rashes or Lesions  Neuro: No Headache, no dizziness or Numbness or tingling  Psych: No Anxiety, depression or sleeping problems  Heme: No Easy bleeding or brusing.     Objective   /81   Pulse 78   Ht 1.524 m (5')   Wt 74.4 kg (164 lb)   SpO2 98%   BMI 32.03 kg/m²     Physical Exam  Constitutional: Alert and in no acute distress. Well developed, well nourished.   Head and Face: Head and face: Normal.    Eyes: Normal external exam. Pupils were equal in size, round, reactive to light (PERRL) with normal accommodation and extraocular movements intact (EOMI).   Ears, Nose, Mouth, and Throat: External inspection of ears and nose: Normal.  Hearing: Normal.  Nasal mucosa, septum, and turbinates: Normal.  Lips, teeth, and gums: Normal.  Oropharynx: Normal.   Neck:  No neck mass was observed. Supple. Thyroid not enlarged and there were no palpable thyroid nodules.   Cardiovascular: Heart rate and rhythm were normal, normal S1 and S2. Pedal pulses: Normal. No peripheral edema.   Pulmonary: No respiratory distress. Clear bilateral breath sounds.   Breast: Normal Appearance, No Masses or lumps palpated  Abdomen: Soft nontender; no abdominal mass palpated. Normal bowel sounds. No organomegaly.   : Deferred   Musculoskeletal: No joint swelling seen, normal movements of all extremities. Range of motion: Normal.  Muscle strength/tone: Normal.    Skin: Normal skin color and pigmentation, normal skin turgor, and no rash.   Neurologic: Deep tendon reflexes were 2+ and symmetric.   Psychiatric: Judgment and insight: Intact. Mood and affect: Normal.  Lymphatic: No cervical lymphadenopathy. Palpation of lymph nodes in axillae: Normal.  Palpation of lymph nodes in groin: Normal.    Lab Results   Component Value Date    WBC 6.8 09/26/2023    HGB 12.2 09/26/2023    HCT 37.7 09/26/2023     09/26/2023    CHOL 164 12/10/2024    TRIG 100 12/10/2024    HDL 52.9 12/10/2024    ALT 24 10/04/2023    AST 26 10/04/2023     10/04/2023    K 4.7 10/04/2023     10/04/2023    CREATININE 0.64 10/04/2023    BUN 9 10/04/2023    CO2 29 10/04/2023    TSH 6.60 (H) 12/10/2024    HGBA1C 9.8 (A) 04/10/2025       Electrocardiogram 12 Lead  Please see ED Provider Note for formal interpretation  Confirmed by DAVINA MORRELL PA-C ([id number]) on 9/17/2021 3:12:41 AM      Assessment/Plan   Problem List Items Addressed This Visit             ICD-10-CM    Adult hypothyroidism E03.9     Check TSH.  Clinically euthyroid.  Continue following with endocrinology             Type 1 diabetes mellitus with hyperglycemia, with long-term current use of insulin E10.65    Relevant Orders    Vitamin B12    Referral to Nutrition Services     Other Visit Diagnoses         Codes    Health care maintenance    -  Primary Z00.00     Relevant Orders    CBC    Comprehensive metabolic panel    Iron and TIBC    Vitamin B12    Mixed hyperlipidemia     E78.2    Relevant Medications    ezetimibe (Zetia) 10 mg tablet    Other Relevant Orders    Iron and TIBC    Vitamin B12    Need for prophylactic vaccination against Streptococcus pneumoniae (pneumococcus)     Z23    Relevant Orders    Pneumococcal conjugate vaccine, 20-valent (PREVNAR 20)              Dear Matthew Land     It was my pleasure to take care of you today in the office. Below are the things we discussed today:    1. 1. Immunizations: Yearly Flu shot is recommended.      Pneumonia vaccine today    2. Blood Work:: ordered  3. Seen your dentist twice a year  4. Yearly Eye exam is recommended    5. BMI: 32.1  6: Diet recommendations:   Eat Clean, Try to have as many home cooked meals as possible  Avoid processed foods which contain excess calories, sugar, and sodium.    7. Exercise recommendations:   150 minutes a week to maintain your weight     If you have to loose weight, you need a better diet and exercise plan.     8. Supplements recommended:  a - Calcium 600 mg up to twice a day to get a total of 1200 mg. Each 8 oz of milk or yogurt or 1 oz of cheese, 1 Banana, 1 serving of green Leafy vegetable has about 300 mg of Calcium, so you may subtract that amount. Calcium citrate is the only acceptable supplement to take if you take an acid suppressing medication like Prilosec; otherwise Calcium carbonate is acceptable too (It can cause Constipation).   b - Vitamin D - 2000 IU daily     9. Please get your Living will / Advance directive completed if you do not have one already. Please make sure our office has a copy of the latest one.     10. Colonoscopy: NA  11. Mammogram: NA  12. PAP: advised follow up with gyn  13. DEXA:NA   14: Skin Check: Please see Dermatology once a year for a Skin Check.     15.follow up annually     Follow up in one year for a Physical. Please call the office  before your Physical to see if you need blood work completed prior to your physical.     Please call me if any questions arise from now until your next visit. I will call you after I am done seeing patients. A Doctor is always available by phone when the office is closed. Please feel free to call for help with any problem that you feel shouldn't wait until the office re-opens.     Parminder Vargas, DO

## 2025-04-10 NOTE — PATIENT INSTRUCTIONS
<Changes to your Omnipod Setting>    Switch to manual mode and change your basal rate 0.9 units/h    WORK ON BOLUSING BEFORE EVERY MEAL (MAKE SURE TO ENTER IN YOUR CARBS AND BLOOD SUGAR)

## 2025-04-10 NOTE — ASSESSMENT & PLAN NOTE
Following with endocrinology.  A1c uncontrolled.  Patient to start bolusing in addition to the basal insulin.  Advised to take the ezetimibe on a daily basis.  Pneumonia vaccine today.  Follow-up with ophthalmology on an annual basis.  Follow-up with this provider annually

## 2025-04-11 DIAGNOSIS — E06.3 HASHIMOTO'S THYROIDITIS: ICD-10-CM

## 2025-04-11 LAB
ALBUMIN SERPL-MCNC: 4.1 G/DL (ref 3.6–5.1)
ALBUMIN/CREAT UR: 2 MG/G CREAT
ALP SERPL-CCNC: 88 U/L (ref 31–125)
ALT SERPL-CCNC: 23 U/L (ref 6–29)
ANION GAP SERPL CALCULATED.4IONS-SCNC: 8 MMOL/L (CALC) (ref 7–17)
AST SERPL-CCNC: 32 U/L (ref 10–30)
BILIRUB SERPL-MCNC: 0.6 MG/DL (ref 0.2–1.2)
BUN SERPL-MCNC: 10 MG/DL (ref 7–25)
CALCIUM SERPL-MCNC: 8.8 MG/DL (ref 8.6–10.2)
CHLORIDE SERPL-SCNC: 106 MMOL/L (ref 98–110)
CO2 SERPL-SCNC: 24 MMOL/L (ref 20–32)
CREAT SERPL-MCNC: 0.71 MG/DL (ref 0.5–0.97)
CREAT UR-MCNC: 87 MG/DL (ref 20–275)
EGFRCR SERPLBLD CKD-EPI 2021: 116 ML/MIN/1.73M2
ERYTHROCYTE [DISTWIDTH] IN BLOOD BY AUTOMATED COUNT: 15.5 % (ref 11–15)
GLUCOSE SERPL-MCNC: 118 MG/DL (ref 65–139)
HCT VFR BLD AUTO: 32.9 % (ref 35–45)
HGB BLD-MCNC: 10 G/DL (ref 11.7–15.5)
IRON SATN MFR SERPL: 4 % (CALC) (ref 16–45)
IRON SERPL-MCNC: 19 MCG/DL (ref 40–190)
MCH RBC QN AUTO: 24.1 PG (ref 27–33)
MCHC RBC AUTO-ENTMCNC: 30.4 G/DL (ref 32–36)
MCV RBC AUTO: 79.3 FL (ref 80–100)
MICROALBUMIN UR-MCNC: 0.2 MG/DL
PLATELET # BLD AUTO: 520 THOUSAND/UL (ref 140–400)
PMV BLD REES-ECKER: 11.2 FL (ref 7.5–12.5)
POTASSIUM SERPL-SCNC: 4.1 MMOL/L (ref 3.5–5.3)
PROT SERPL-MCNC: 7.5 G/DL (ref 6.1–8.1)
RBC # BLD AUTO: 4.15 MILLION/UL (ref 3.8–5.1)
SODIUM SERPL-SCNC: 138 MMOL/L (ref 135–146)
TIBC SERPL-MCNC: 461 MCG/DL (CALC) (ref 250–450)
TSH SERPL-ACNC: 7.49 MIU/L
VIT B12 SERPL-MCNC: 401 PG/ML (ref 200–1100)
WBC # BLD AUTO: 5.3 THOUSAND/UL (ref 3.8–10.8)

## 2025-04-11 RX ORDER — LEVOTHYROXINE SODIUM 75 UG/1
TABLET ORAL
Qty: 90 TABLET | Refills: 3 | Status: SHIPPED | OUTPATIENT
Start: 2025-04-11

## 2025-04-15 ENCOUNTER — PATIENT OUTREACH (OUTPATIENT)
Dept: CARE COORDINATION | Facility: CLINIC | Age: 35
End: 2025-04-15
Payer: COMMERCIAL

## 2025-04-15 DIAGNOSIS — D50.9 IRON DEFICIENCY ANEMIA, UNSPECIFIED IRON DEFICIENCY ANEMIA TYPE: Primary | ICD-10-CM

## 2025-04-15 RX ORDER — FERROUS SULFATE 325(65) MG
325 TABLET, DELAYED RELEASE (ENTERIC COATED) ORAL
Qty: 90 TABLET | Refills: 0 | Status: SHIPPED | OUTPATIENT
Start: 2025-04-15 | End: 2025-05-15

## 2025-04-15 NOTE — PROGRESS NOTES
Referral received for DM Education from Parminder Vargas DO. Attempted to reach pt at listed number. Voice message was left with contact information for return call.     Thank you,  Rosio Hopkins M.Ed, RDN, LD, [facility]  Registered Dietitian, Certified Diabetes Care and    [phone]

## 2025-04-23 ENCOUNTER — PATIENT OUTREACH (OUTPATIENT)
Dept: CARE COORDINATION | Facility: CLINIC | Age: 35
End: 2025-04-23
Payer: COMMERCIAL

## 2025-04-23 NOTE — PROGRESS NOTES
Referral received for DM Education from Parminder Vargas DO.  Appointment scheduled for 5/7/25 at 2:00pm virtually.      Thank you,  Rosio Hopkins M.Ed, RDN, LD, [facility]  Registered Dietitian, Certified Diabetes Care and    [phone]

## 2025-05-06 ENCOUNTER — APPOINTMENT (OUTPATIENT)
Dept: ENDOCRINOLOGY | Facility: CLINIC | Age: 35
End: 2025-05-06
Payer: COMMERCIAL

## 2025-05-06 DIAGNOSIS — E10.65 TYPE 1 DIABETES MELLITUS WITH HYPERGLYCEMIA, WITH LONG-TERM CURRENT USE OF INSULIN: Primary | ICD-10-CM

## 2025-05-06 PROCEDURE — 99211 OFF/OP EST MAY X REQ PHY/QHP: CPT | Performed by: PHARMACIST

## 2025-05-06 PROCEDURE — 95251 CONT GLUC MNTR ANALYSIS I&R: CPT | Performed by: PHARMACIST

## 2025-05-06 NOTE — PROGRESS NOTES
Clinical Pharmacy Team met with Matthew Land regarding a consultation for diabetes management thanks to a referral from Dr. Jannette Manuel MD . Below is a summary of our conversation and recommendations:  ________________________________________________________________________      Allergies[1]        Objective     There were no vitals taken for this visit.  Medical History[2]      Medications Ordered Prior to Encounter[3]      Lab Review  Lab Results   Component Value Date    BILITOT 0.6 04/10/2025    CALCIUM 8.8 04/10/2025    CO2 24 04/10/2025     04/10/2025    CREATININE 0.71 04/10/2025    GLUCOSE 118 04/10/2025    ALKPHOS 88 04/10/2025    K 4.1 04/10/2025    PROT 7.5 04/10/2025     04/10/2025    AST 32 (H) 04/10/2025    ALT 23 04/10/2025    BUN 10 04/10/2025    ANIONGAP 8 04/10/2025    MG 1.64 2023    PHOS 3.0 2023    ALBUMIN 4.1 04/10/2025    LIPASE 11 2021    GFRF >90 2023     Lab Results   Component Value Date    TRIG 100 12/10/2024    CHOL 164 12/10/2024    LDLCALC 91 12/10/2024    HDL 52.9 12/10/2024     Lab Results   Component Value Date    HGBA1C 9.8 (A) 04/10/2025    HGBA1C 11.0 (H) 12/10/2024    HGBA1C 9.6 (A) 2024         Monitoring         Assessment/Plan     The patient reports today for a diabetes consultation. Reviewed CGM report an discussed it with the patient. CGM data suggests hyperglycemia throughout the entire day. Pump report suggests that she does not bolus on most days of the week which is contributing to her hyperglycemia. Discussed this with the patient. Educated the patient on the importance of entering her carbs. Answered all patient questions. At this time recommend no setting changes. Patient was agreeable to this plan.    A minimum of 72 hours of CGM data was reviewed and used to make therapy changes.    PATIENT EDUCATION/GOALS    Goals  Fasting B - 130 mg/dL  Postprandial BG: less than 180 mg/dL  A1c: less than 7%    Provided  counseling on lifestyle modifications, medications, and self-monitoring. Patient has no additional questions at this time. Pharmacy to follow up in 5-6 weeks. Please reach out with any questions. Thank you.       Torin Thompson, PharmD    Provider on site: Pearl Paul CNP  Continue all meds under the continuation of care with the referring provider and clinical pharmacy team.         [1]   Allergies  Allergen Reactions    Ciprofloxacin Chills     Vomiting   [2]   Past Medical History:  Diagnosis Date    Acute candidiasis of vulva and vagina 01/06/2014    Vaginal candidiasis    Acute vaginitis 01/06/2014    Bacterial vaginosis    Encounter for screening for infections with a predominantly sexual mode of transmission 01/06/2014    Screening examination for venereal disease    Personal history of other diseases of the female genital tract 01/06/2014    History of cervicitis    Personal history of other diseases of the respiratory system 03/02/2016    History of streptococcal pharyngitis    Personal history of other diseases of the respiratory system 03/02/2016    History of pharyngitis    Personal history of other endocrine, nutritional and metabolic disease 01/06/2014    History of diabetes mellitus   [3]   Current Outpatient Medications on File Prior to Visit   Medication Sig Dispense Refill    alcohol swabs pads, medicated USE TOPICALLY PRIOR TO TESTING OR INJECTING INSULIN.      blood sugar diagnostic (ONETOUCH VERIO TEST STRIPS MISC) by in vitro route 3 times a day.      blood-glucose meter misc       Dexcom G7 Sensor device Change sensor every 10 days 9 each 3    ezetimibe (Zetia) 10 mg tablet Take 1 tablet (10 mg) by mouth once daily. 90 tablet 3    ferrous sulfate 325 (65 Fe) mg EC tablet Take 1 tablet by mouth 3 times daily (morning, midday, late afternoon). Do not crush, chew, or split. 90 tablet 0    glucagon (Gvoke) 1 mg/0.2 mL solution Inject 1 mg under the skin if needed (Use with severe hypoglycemia).  "0.2 mL 3    glucose 4 gram chewable tablet Chew if needed for low blood sugar - see comments.      insulin glargine (Lantus Solostar U-100 Insulin) 100 unit/mL (3 mL) pen Inject 25 units daily in case of pump failure 15 mL 1    insulin lispro 100 unit/mL injection For use in insulin pump. MDD 70 units. 70 mL 3    ketone blood test strip       lancets 33 gauge misc       levothyroxine (Synthroid, Levoxyl) 75 mcg tablet Take 1 tab daily (first thing in the morning on an empty stomach, by itself with water. Wait 30 mins before eating/drinking/taking other meds. Wait 4 hours before taking calcium/iron/multivitamins 90 tablet 3    omeprazole (PriLOSEC) 40 mg DR capsule Take 1 capsule (40 mg) by mouth once daily. 90 capsule 0    ondansetron ODT (Zofran-ODT) 4 mg disintegrating tablet Dissolve 1 tablet (4 mg) in the mouth every 8 hours if needed for vomiting.      OneTouch Delica Plus Lancet 30 gauge misc USE TO TEST BLOOD SUGAR AS DIRECTED      OneTouch Verio test strips strip USE TO TEST BLOOD SUGAR AS DIRECTED      pantoprazole (ProtoNix) 40 mg EC tablet Take 1 tablet (40 mg) by mouth once daily in the morning. Take before meals.      pen needle, diabetic 31 gauge x 3/16\" needle 5 times a day.      valACYclovir (Valtrex) 500 mg tablet Take 1 tablet (500 mg) by mouth once daily.       No current facility-administered medications on file prior to visit.     "

## 2025-05-07 ENCOUNTER — APPOINTMENT (OUTPATIENT)
Dept: CARE COORDINATION | Facility: CLINIC | Age: 35
End: 2025-05-07
Payer: COMMERCIAL

## 2025-05-08 DIAGNOSIS — D50.9 IRON DEFICIENCY ANEMIA, UNSPECIFIED IRON DEFICIENCY ANEMIA TYPE: ICD-10-CM

## 2025-05-08 RX ORDER — FERROUS SULFATE 325(65) MG
325 TABLET, DELAYED RELEASE (ENTERIC COATED) ORAL 3 TIMES DAILY
Qty: 90 TABLET | Refills: 0 | OUTPATIENT
Start: 2025-05-08 | End: 2025-06-07

## 2025-05-19 ENCOUNTER — APPOINTMENT (OUTPATIENT)
Dept: OBSTETRICS AND GYNECOLOGY | Facility: CLINIC | Age: 35
End: 2025-05-19
Payer: COMMERCIAL

## 2025-06-02 DIAGNOSIS — E06.3 HASHIMOTO'S THYROIDITIS: ICD-10-CM

## 2025-06-15 NOTE — PROGRESS NOTES
"Patient is sent at the request of Jannette Manuel MD for my opinion regarding diabetes.  My recommendations below will be communicated back to the requesting provider by way of shared medical record.    Recommendations:   No settings changed today  Please make sure to enter carbs before ALL meals  If blood sugar is high between meals make sure to give corrections if your blood sugars are high, do this by leaving carbs at zero and clicking \"use sensor\"  ________________________________________________________________________    Subjective   Past Medical History:  Problem List[1]      HPI:  Matthew Land is a 34 y.o. female with a PMH significant for T1DM w/ h/o DKA  Pt presents today for new patient visit with endo PharmD for Type 1 Diabetes Mellitus  Last seen by Torin Thompson on 5/6 where no setting changes were made, instructed pt to begin entering carbs before meals    Today:   Patient has not been entering carbs or giving corrections, sometimes will enter a set amount of insulin for a correction vs clicking \"use sensor\"  Feels she can carb count she just doesn't  Has been in manual mode 50% of the time  Has been moving around a lot causing some stress    Diabetes Pharmacotherapy:    Humalog U100 via Omnipod 5 w/ Dexcom G7      Social:  Current diet: on average, 1-2 meals per day  Breakfast - skips  Lunch - sub sandwich + chips  Dinner - salad + chicken  Snack - sometimes chips  Fluids - water, probiotic      Allergies:  Ciprofloxacin    Medication list:  Current Outpatient Medications   Medication Instructions    alcohol swabs pads, medicated USE TOPICALLY PRIOR TO TESTING OR INJECTING INSULIN.    blood sugar diagnostic (ONETOUCH VERIO TEST STRIPS MISC) 3 times daily    blood-glucose meter misc No dose, route, or frequency recorded.    Dexcom G7 Sensor device Change sensor every 10 days    ezetimibe (ZETIA) 10 mg, oral, Daily    glucose 4 gram chewable tablet As needed    Gvoke 1 mg, subcutaneous, As needed " "   insulin glargine (Lantus Solostar U-100 Insulin) 100 unit/mL (3 mL) pen Inject 25 units daily in case of pump failure    insulin lispro 100 unit/mL injection For use in insulin pump. MDD 70 units.    ketone blood test strip No dose, route, or frequency recorded.    lancets 33 gauge misc No dose, route, or frequency recorded.    levothyroxine (Synthroid, Levoxyl) 75 mcg tablet Take 1 tab daily (first thing in the morning on an empty stomach, by itself with water. Wait 30 mins before eating/drinking/taking other meds. Wait 4 hours before taking calcium/iron/multivitamins    omeprazole (PRILOSEC) 40 mg, oral, Daily    ondansetron ODT (ZOFRAN-ODT) 4 mg, Every 8 hours PRN    OneTouch Delica Plus Lancet 30 gauge misc USE TO TEST BLOOD SUGAR AS DIRECTED    OneTouch Verio test strips strip USE TO TEST BLOOD SUGAR AS DIRECTED    pantoprazole (PROTONIX) 40 mg, Daily before breakfast    pen needle, diabetic 31 gauge x 3/16\" needle 5 times daily    valACYclovir (VALTREX) 500 mg, Daily        Objective   Last Recorded Vitals:  BP Readings from Last 3 Encounters:   04/10/25 117/81   04/10/25 122/78   12/10/24 125/81     Wt Readings from Last 3 Encounters:   04/10/25 74.4 kg (164 lb)   04/10/25 75.3 kg (166 lb)   12/10/24 74.8 kg (165 lb)     BMI Readings from Last 1 Encounters:   04/10/25 32.03 kg/m²      Labs  A1C  Lab Results   Component Value Date    HGBA1C 9.8 (A) 04/10/2025    HGBA1C 11.0 (H) 12/10/2024    HGBA1C 9.6 (A) 07/31/2024     BMP/LFTs  Lab Results   Component Value Date    CREATININE 0.71 04/10/2025    CREATININE 0.64 10/04/2023    CREATININE 0.69 09/26/2023    EGFR 116 04/10/2025    EGFR >90 10/04/2023    GLUCOSE 118 04/10/2025     04/10/2025    K 4.1 04/10/2025     04/10/2025    CALCIUM 8.8 04/10/2025    CO2 24 04/10/2025    BUN 10 04/10/2025    ALT 23 04/10/2025    AST 32 (H) 04/10/2025    ALKPHOS 88 04/10/2025    BILITOT 0.6 04/10/2025     Lipids  Lab Results   Component Value Date    TRIG 100 " "12/10/2024    CHOL 164 12/10/2024    LDLF 101 (H) 09/24/2023    LDLCALC 91 12/10/2024    HDL 52.9 12/10/2024     Urine Albumin Creatinine Ratio  Lab Results   Component Value Date    MICROALBCREA 2 04/10/2025    MICROALBCREA  10/04/2023      Comment:      One or more analytes used in this calculation is outside of the analytical measurement range. Calculation cannot be performed.    MICROALBCREA SEE COMMENT 04/29/2023     ASCVD risk  The ASCVD Risk score (Francisco WILLIAMSON, et al., 2019) failed to calculate for the following reasons:    The 2019 ASCVD risk score is only valid for ages 40 to 79    Additional labs:  No results found for: \"FRUCTOSAMINE\", \"CPEPTIDE\", \"ODE74JI\", \"NTIB\", \"ZNT8A\", \"INSAB\"    Home glucose monitoring:  Hypoglycemia: 1%, related to entering a number of units of insulin for correction vs allowing pump to give correction w/ \"use sensor\".        Assessment/Plan   Type 1 Diabetes Mellitus  Goal A1C <6.5%, above goal as of 4/2025. TIR and GMI not at goal, occasional lows d/t manual bolusing. Discussed in depth about proper use of pump, as pt is missing all meal boluses and is frequently going into manual mode d/t prolonged hyperglycemia. Pt confirms she is comfortable carb counting, however in the future may need to consider set dosing to make entering carbs easier.  Plan:  No settings changed today  Please make sure to enter carbs before ALL meals  If blood sugar is high between meals make sure to give corrections if your blood sugars are high, do this by leaving carbs at zero and clicking \"use sensor\"  Home glucose monitoring:   Will continue Dexcom G7  A minimum of 72 hours of CGM data was reviewed and used to make therapy changes.   Education Provided to Patient:   Importance of DM control   Hypertension:   Goal BP <130/80    Last 3/3 clinic readings at/near goal  Denies s/sx of hyper-/hypo-tension   Primary prevention:   Therapy: Ezetimibe , no statin (age <40)  LDL result meets goal " (12/2024)  Renal:  CKD: stage 1 - normal function  ACR: <30 (4/2025)  Renal protective agents: none  DM medications are dosed appropriately for renal function  Labs: up to date   PharmD follow-up: 3 months  Endo follow-up: 7/17    Patient agreeable to plan as above, contact information provided for any future questions or concerns.    Archie Salcedo PharmD    Type of encounter: virtual    Continue all meds under the continuation of care with the referring provider and clinical pharmacy team.           [1]   Patient Active Problem List  Diagnosis    Abnormal liver enzymes    Cataract associated with type 1 diabetes mellitus    Diffuse goiter    Gastroesophageal reflux disease without esophagitis    Graves disease    Hashimoto's thyroiditis    Thyromegaly    PCO (posterior capsular opacification), left    DKA (diabetic ketoacidosis) (Multi)    Adult hypothyroidism    Anterior capsular opacification    Difficulty walking    Dysuria    Fissure in skin of foot    Pseudophakia of both eyes    UTI (urinary tract infection), bacterial    HSV (herpes simplex virus) infection    Type 1 diabetes mellitus with hyperglycemia, with long-term current use of insulin    Influenza

## 2025-06-17 ENCOUNTER — APPOINTMENT (OUTPATIENT)
Dept: ENDOCRINOLOGY | Facility: CLINIC | Age: 35
End: 2025-06-17
Payer: COMMERCIAL

## 2025-06-17 DIAGNOSIS — E10.65 TYPE 1 DIABETES MELLITUS WITH HYPERGLYCEMIA, WITH LONG-TERM CURRENT USE OF INSULIN: ICD-10-CM

## 2025-06-17 NOTE — PATIENT INSTRUCTIONS
"No settings changed today  Please make sure to enter carbs before ALL meals  If blood sugar is high between meals make sure to give corrections if your blood sugars are high, do this by leaving carbs at zero and clicking \"use sensor\"  If you have any questions or concerns, call me at: [phone]   "

## 2025-07-16 NOTE — PROGRESS NOTES
"Endocrinology  07/17/25    History of Present Illness   Matthew Land is a 34 y.o. year old female with medical history of T1DM, HLD, and hypothyroidism 2/2 Hashimoto's, here for T1DM and hypothyroidism.     Prior patient of Dr. Manuel. Met with PharmD on 6/17/25.    Patient has a history of not bolusing before meals causing ongoing post-prandial hyperglycemia. She also does not provide herself correction. Her hyperglycemia kicks her out of automode.     Today the patient states, she is in her normal state of health.       Current TRT: 75 mcg daily. Last TSH elevated, no repeat since levothyroxine dose was increased by Dr. Manuel.     Ca/Mg/Fe/biotin/PPI: takes iron at night  Takes it first thing in morning, eats >60 minutes after      CGM Interpretation  CGM:   Dates reviewed: 7/3-7/16/25  Mean glucose: 223 (8.7%)  Low alerts: <70  High alerts: off  Rise/fall alert:  off  >180 mg/dl: 60%  Target  mg/dl: 40%  <70 mg/dl: 0%  Interpretation  Hypoglycemia pattern:  some hypoglycemia when she is kicked into manual mode  Hyperglycemia pattern:    persistent overnight hyperglycemia and post-prandial hyperglycemia    Omnipod Pump settings  71% auto mode 29% manual mode  25 total units/day  84%basal 16%bolus  ICR: 9  ISF: 30  Basal rate (in manual): 1 unit/hr  Goal glucose: 110         Diabetes Summary   Diagnosis Date:  2015 (age 25)          Glucose Meter: onetouch verio  Diabetes Tech (CGM or pump):     omnipod 5 + dexcom G7         Eye Exam:  1/2025, no DR, due 1/2026  Foot Exam:     1/2024, no neuropathy, due now                               Diet:      B: skips  L: 1230-3P, cannot pin down what she eats; tries to keep carb counts around 60  D:   8P, \" \"  Denies snacking much at night but does snack between 430-6P             Her current regimen is as follows:   Orals:   none                Injectables: none  Insulin: humalog in pumpdenies  Hx of pancreatitis: denies  Hx of medullary thyroid cancer: " denies  Complications: HLD  Last episode of DKA or hyperosmolar coma:  9/2023      Hypoglycemia:  Frequency and timing of hypoglycemia: low this AM, seems to be associated with manual settings  Time of day hypoglycemia usually occurs: tends to be in AM  Severity of hypoglycemic episode: mild  Hypoglycemic threshold: <70  Hypoglycemia symptoms: hot sweaty  Last call to EMS or hospitalization: denies    Review of Systems   General: Denies fever or chills   Head: Denies headache or vision changes   Neck: Denies tenderness or lumps   Cardiac: Denies chest pain or palpitations   Respiratory: Denies shortness of breath or cough   GI: Denies abdominal pain, nausea, or vomiting   Extremities: Denies swelling   Skin: Denies rash     Medications     Current Outpatient Medications   Medication Instructions    alcohol swabs pads, medicated USE TOPICALLY PRIOR TO TESTING OR INJECTING INSULIN.    blood sugar diagnostic (ONETOUCH VERIO TEST STRIPS MISC) 3 times daily    blood-glucose meter misc No dose, route, or frequency recorded.    Dexcom G7 Sensor device Change sensor every 10 days    ezetimibe (ZETIA) 10 mg, oral, Daily    glucose 4 gram chewable tablet As needed    Gvoke 1 mg, subcutaneous, As needed    insulin glargine (Lantus Solostar U-100 Insulin) 100 unit/mL (3 mL) pen Inject 25 units daily in case of pump failure    insulin lispro 100 unit/mL injection For use in insulin pump. MDD 70 units.    ketone blood test strip No dose, route, or frequency recorded.    lancets 33 gauge misc No dose, route, or frequency recorded.    levothyroxine (Synthroid, Levoxyl) 75 mcg tablet Take 1 tab daily (first thing in the morning on an empty stomach, by itself with water. Wait 30 mins before eating/drinking/taking other meds. Wait 4 hours before taking calcium/iron/multivitamins    omeprazole (PRILOSEC) 40 mg, oral, Daily    ondansetron ODT (ZOFRAN-ODT) 4 mg, Every 8 hours PRN    OneTouch Delica Plus Lancet 30 gauge misc USE TO TEST  "BLOOD SUGAR AS DIRECTED    [employer] Verio test strips strip USE TO TEST BLOOD SUGAR AS DIRECTED    pantoprazole (PROTONIX) 40 mg, Daily before breakfast    pen needle, diabetic 31 gauge x 3/16\" needle 5 times daily    valACYclovir (VALTREX) 500 mg, Daily        History   Medical History[1]    Surgical History[2]    Family History[3]     RX Allergies[4]     Social history: denies tobacco, occasional etoh, denies illicits. Works as financial wellness associate.       Physical Exam   /80   Pulse 73   Ht 1.613 m (5' 3.5\")   Wt 73.2 kg (161 lb 6 oz)   BMI 28.14 kg/m²    General: Well appearing, no acute distress  Neuro: alert and oriented  Head and Neck: NCAT  Heart: Normal rate  Lungs: Breathing comfortably on room air   Skin: No rashes    Labs and Imaging     Lab Results   Component Value Date    HGBA1C 9.8 (A) 04/10/2025    HGBA1C 11.0 (H) 12/10/2024    HGBA1C 9.6 (A) 07/31/2024     04/10/2025    K 4.1 04/10/2025     04/10/2025    CO2 24 04/10/2025    BUN 10 04/10/2025    CREATININE 0.71 04/10/2025    CALCIUM 8.8 04/10/2025    ALBUMIN 4.1 04/10/2025    PROT 7.5 04/10/2025    BILITOT 0.6 04/10/2025    ALKPHOS 88 04/10/2025    ALT 23 04/10/2025    AST 32 (H) 04/10/2025    GLUCOSE 118 04/10/2025    CHOL 164 12/10/2024    TRIG 100 12/10/2024    HDL 52.9 12/10/2024    BHYDRXBUT 5.35 (H) 09/16/2021       Assessment and Plan   Matthew Land is a 34 y.o. year old female with medical history of  T1DM, HLD, and hypothyroidism 2/2 Hashimoto's, here for T1DM and hypothyroidism.     A1c not reading today, but GMI improved to 8.7%. Congratulated patient on bolusing more than last appointment. I recommended she continue to work on this and try to at least bolus with dinner as the lack of bolusing causes her to remain hyperglycemic through the night. Will complete labs tomorrow.      #T1DM  - Dx in 2015   - Complications include: HLD  - Most recent A1c is  9.8% (04/2025), 11.0% (12/2024), 9.6% (7/30/2024), " 9.0% (1/31/2024), 13.4% (09/2023), 11.7% (04/2023), 12.2% (01/2023) . Goal a1c is <7%  - Health maintenance:  ==> According to the ADA, BP goal is <130/80. Patient is at goal.  ==> Most recent retinal exam showed no DR. Due 1/2026  ==> Most recent LDL is 91. Due for repeat in 12/2025. Goal LDL <100.  ==> Most recent urine microalb/Cr ratio is 2. Due for repeat in 4/2026.  ==> Foot exam due now deferred today d/t timing. Will obtain at next visit. No known hx of neuropathy  ==> TSH (see thyroid section)  - Medication management:  ==> decreased basal to 0.9 unit/hr as she becomes hypoglycemic when kicked into manual mode. Again bolusing is key as she is kicked into manual mode due to hyperglycemia caused by missed boluses.      #Hypothyroidism  - etiology: Hashimoto's  - Current TRT: 75 mcg daily  - Due for repeat labs. Reminded today        RTC: next available (12/2025) continue to follow with pharmD     I spent a total of 40+ minutes on the date of the service which included preparing to see the patient, face-to-face patient care, completing clinical documentation, obtaining and/or reviewing separately obtained history, performing a medically appropriate examination, counseling and educating the patient/family/caregiver, and ordering medications, tests, or procedures.        Meg Naik MD   of Medicine  Department of Medicine  [facility]  [facility]                                  [1]   Past Medical History:  Diagnosis Date    Acute candidiasis of vulva and vagina 01/06/2014    Vaginal candidiasis    Acute vaginitis 01/06/2014    Bacterial vaginosis    Diabetes mellitus type I (Multi)     Encounter for screening for infections with a predominantly sexual mode of transmission 01/06/2014    Screening examination for venereal disease    Graves disease     Hashimoto's thyroiditis     Personal history of other diseases of the female genital  tract 01/06/2014    History of cervicitis    Personal history of other diseases of the respiratory system 03/02/2016    History of streptococcal pharyngitis    Personal history of other diseases of the respiratory system 03/02/2016    History of pharyngitis    Personal history of other endocrine, nutritional and metabolic disease 01/06/2014    History of diabetes mellitus   [2]   Past Surgical History:  Procedure Laterality Date    OTHER SURGICAL HISTORY  03/02/2016    Prior Surgical Procedure Not Done   [3]   Family History  Problem Relation Name Age of Onset    Asthma Mother      Hypertension Mother      Hypertension Other      Stroke Mother Ely bulls     Crohn's disease Maternal Grandmother Perri bulls     Thyroid disease Maternal Grandmother Perri bulls     Crohn's disease Maternal Grandmother Perri bulls     Thyroid disease Maternal Grandmother Perri bulls     Crohn's disease Maternal Grandmother Perri bulls     Thyroid disease Maternal Grandmother Perri bulls    [4]   Allergies  Allergen Reactions    Ciprofloxacin Chills     Vomiting

## 2025-07-17 ENCOUNTER — OFFICE VISIT (OUTPATIENT)
Age: 35
End: 2025-07-17
Payer: COMMERCIAL

## 2025-07-17 VITALS
SYSTOLIC BLOOD PRESSURE: 112 MMHG | BODY MASS INDEX: 27.55 KG/M2 | HEIGHT: 64 IN | DIASTOLIC BLOOD PRESSURE: 80 MMHG | HEART RATE: 73 BPM | WEIGHT: 161.38 LBS

## 2025-07-17 DIAGNOSIS — E10.65 TYPE 1 DIABETES MELLITUS WITH HYPERGLYCEMIA, WITH LONG-TERM CURRENT USE OF INSULIN: Primary | ICD-10-CM

## 2025-07-17 DIAGNOSIS — E03.9 HYPOTHYROIDISM (ACQUIRED): ICD-10-CM

## 2025-07-17 PROCEDURE — 3046F HEMOGLOBIN A1C LEVEL >9.0%: CPT | Performed by: STUDENT IN AN ORGANIZED HEALTH CARE EDUCATION/TRAINING PROGRAM

## 2025-07-17 PROCEDURE — 99215 OFFICE O/P EST HI 40 MIN: CPT | Mod: 25 | Performed by: STUDENT IN AN ORGANIZED HEALTH CARE EDUCATION/TRAINING PROGRAM

## 2025-07-17 PROCEDURE — 99215 OFFICE O/P EST HI 40 MIN: CPT | Performed by: STUDENT IN AN ORGANIZED HEALTH CARE EDUCATION/TRAINING PROGRAM

## 2025-07-17 PROCEDURE — 3079F DIAST BP 80-89 MM HG: CPT | Performed by: STUDENT IN AN ORGANIZED HEALTH CARE EDUCATION/TRAINING PROGRAM

## 2025-07-17 PROCEDURE — 3008F BODY MASS INDEX DOCD: CPT | Performed by: STUDENT IN AN ORGANIZED HEALTH CARE EDUCATION/TRAINING PROGRAM

## 2025-07-17 PROCEDURE — 3074F SYST BP LT 130 MM HG: CPT | Performed by: STUDENT IN AN ORGANIZED HEALTH CARE EDUCATION/TRAINING PROGRAM

## 2025-07-17 ASSESSMENT — PATIENT HEALTH QUESTIONNAIRE - PHQ9
2. FEELING DOWN, DEPRESSED OR HOPELESS: NOT AT ALL
1. LITTLE INTEREST OR PLEASURE IN DOING THINGS: NOT AT ALL
SUM OF ALL RESPONSES TO PHQ9 QUESTIONS 1 AND 2: 0

## 2025-07-17 ASSESSMENT — ENCOUNTER SYMPTOMS
OCCASIONAL FEELINGS OF UNSTEADINESS: 0
DEPRESSION: 0
LOSS OF SENSATION IN FEET: 0

## 2025-07-17 NOTE — PATIENT INSTRUCTIONS
After Visit Summary  It was great seeing you today!    In summary from our visit today, I would like to remind you of the following:    - please remember to bolus before every meal. Even if you want to just pick one to stay consistent with, pick dinner and then expand to lunch.  - please get fasting labs done tomorrow  - depending on your TSH I may modify your levothyroxine prescription.     - pump failure plan  ==> inject 20 units of lantus every 24 hrs  ==> continue 1 unit for every 9 grams of carbs, carb ratio  ==> correction: 1 unit for 50 points >150  1:50 correction  151-200 = 1 unit;  201-250 = 2 units;  251-300 = 3 units;  301-350 = 4 units;  351-400 = 5 units     Division of Endocrinology   Follow-up appointments:  Please arrive at least 20 minutes prior to your follow up appointments in order to keep visits as close as possible to the scheduled times. If you need to cancel an appointment, please call at least 24 hours in advance.    Please bring your medications or an updated list of medications to each of your visits to help ensure safety in prescribing future medications.    If you are being seen for diabetes, please bring your glucose meter and/or glucose log with 2 weeks of glucose readings to each visit.    Medication Refills: Please request medication refills at the time of your appointment.   - Refills requested by phone or [facility] in between visits require at least 3 business days to be processed.    Lab Results: Please allow at least 7 business days for lab results to be reviewed.  - Please note, that some specialty testing may take up to at least 7 business to be completed.   - If there are any urgent issues requiring immediate attention, we will contact you at your provided phone numbers.   - Any non-urgent results will be relayed via [employer] if you have signed up for this service or via a letter through the mail and/or phone call.     Communication with your provider:  - [facility]  [facility] is highly recommended as the most efficient means to contact your provider. However for urgent concerns please call.   - For phone calls, please call our office Monday- Friday 8am to 4:30pm and your message will be relayed to your provider. Please allows 1-2 business days for a response.  - After hours messages are left with an answering service and will be handled by the clinic the following business day.      Sincerely,   Meg Naik MD  Division of Endocrinology  [facility]

## 2025-08-05 ENCOUNTER — PATIENT MESSAGE (OUTPATIENT)
Age: 35
End: 2025-08-05
Payer: COMMERCIAL

## 2025-08-05 DIAGNOSIS — E10.65 TYPE 1 DIABETES MELLITUS WITH HYPERGLYCEMIA, WITH LONG-TERM CURRENT USE OF INSULIN: Primary | ICD-10-CM

## 2025-08-06 DIAGNOSIS — E10.65 TYPE 1 DIABETES MELLITUS WITH HYPERGLYCEMIA, WITH LONG-TERM CURRENT USE OF INSULIN: ICD-10-CM

## 2025-08-06 RX ORDER — BLOOD-GLUCOSE METER
EACH MISCELLANEOUS
Qty: 100 STRIP | Refills: 11 | Status: SHIPPED | OUTPATIENT
Start: 2025-08-06 | End: 2025-08-07

## 2025-08-06 RX ORDER — LANCETS 33 GAUGE
EACH MISCELLANEOUS
Qty: 100 EACH | Refills: 11 | Status: SHIPPED | OUTPATIENT
Start: 2025-08-06

## 2025-08-06 NOTE — TELEPHONE ENCOUNTER
Called patient . She reports that her Omni Pod  yesterday and that she lost her Omni Pod 5 PDM. She is currently wearing a Dexcom CGM which has read >400 since 12 AM. She does not have any manual testing supplies to confirm her blood glucose.     Pt denies N/V, stomach pain, blurry vision, polydipsia, or polyuria. Pt reports that she feels sluggish and unwell. Encouraged pt to seek emergency medical care due to her hyperglycemia and no insulin pump connection. She is unable to start a new insulin pump without a new PDM. Patient's phone is not compatible with the Omni pod 5 richi which functions similar to the PDM.     MADHU Vigil, RN, RD, LD

## 2025-08-06 NOTE — TELEPHONE ENCOUNTER
Spoke with the patient this morning. Her pump stopped working last night and she did not inject lantus until this morning. She is still feeling lethargic. I recommended she go to the ED for evaluation. I recommended that if this happens again she immediately inject her insulin as opposed to waiting until the morning. I am concerned that because she has gone 12+ hours without insulin she could be in the early stages of DKA. I informed her of the message regarding how to obtain a PDM. She voiced understanding.

## 2025-08-07 RX ORDER — BLOOD-GLUCOSE METER
EACH MISCELLANEOUS
Qty: 100 STRIP | Refills: 11 | Status: SHIPPED | OUTPATIENT
Start: 2025-08-07

## 2025-09-16 ENCOUNTER — APPOINTMENT (OUTPATIENT)
Dept: PHARMACY | Facility: HOSPITAL | Age: 35
End: 2025-09-16
Payer: COMMERCIAL